# Patient Record
Sex: FEMALE | Race: WHITE | Employment: FULL TIME | URBAN - METROPOLITAN AREA
[De-identification: names, ages, dates, MRNs, and addresses within clinical notes are randomized per-mention and may not be internally consistent; named-entity substitution may affect disease eponyms.]

---

## 2020-01-09 RX ORDER — SODIUM CHLORIDE, SODIUM LACTATE, POTASSIUM CHLORIDE, CALCIUM CHLORIDE 600; 310; 30; 20 MG/100ML; MG/100ML; MG/100ML; MG/100ML
INJECTION, SOLUTION INTRAVENOUS CONTINUOUS
Status: CANCELLED | OUTPATIENT
Start: 2020-01-09 | End: 2020-01-10

## 2020-01-31 ENCOUNTER — HOSPITAL ENCOUNTER (OUTPATIENT)
Dept: PREADMISSION TESTING | Age: 35
Discharge: HOME | End: 2020-01-31
Payer: COMMERCIAL

## 2020-01-31 VITALS
RESPIRATION RATE: 20 BRPM | OXYGEN SATURATION: 97 % | BODY MASS INDEX: 27.08 KG/M2 | SYSTOLIC BLOOD PRESSURE: 157 MMHG | HEIGHT: 67 IN | DIASTOLIC BLOOD PRESSURE: 92 MMHG | TEMPERATURE: 98.8 F | HEART RATE: 98 BPM | WEIGHT: 172.56 LBS

## 2020-01-31 LAB
ABO + RH BLD: NORMAL
BLOOD GROUP ANTIBODIES SERPL: NORMAL
ERYTHROCYTE [DISTWIDTH] IN BLOOD BY AUTOMATED COUNT: 13.2 % (ref 11.5–14.5)
HCT VFR BLD AUTO: 45.3 % (ref 35–47)
HGB BLD-MCNC: 14.8 G/DL (ref 11.5–16)
MCH RBC QN AUTO: 29.5 PG (ref 26–34)
MCHC RBC AUTO-ENTMCNC: 32.7 G/DL (ref 30–36.5)
MCV RBC AUTO: 90.4 FL (ref 80–99)
NRBC # BLD: 0 K/UL (ref 0–0.01)
NRBC BLD-RTO: 0 PER 100 WBC
PLATELET # BLD AUTO: 389 K/UL (ref 150–400)
PMV BLD AUTO: 10.4 FL (ref 8.9–12.9)
RBC # BLD AUTO: 5.01 M/UL (ref 3.8–5.2)
SPECIMEN EXP DATE BLD: NORMAL
WBC # BLD AUTO: 8.5 K/UL (ref 3.6–11)

## 2020-01-31 PROCEDURE — 36415 COLL VENOUS BLD VENIPUNCTURE: CPT

## 2020-01-31 PROCEDURE — 85027 COMPLETE CBC AUTOMATED: CPT

## 2020-01-31 PROCEDURE — 86900 BLOOD TYPING SEROLOGIC ABO: CPT

## 2020-01-31 NOTE — PERIOP NOTES
[address] , [address] [facility]   MAIN OR                                  [phone]   MAIN PRE OP                          [phone]                                                                                AMBULATORY PRE OP          [phone]  PRE-ADMISSION TESTING    [phone]     Surgery Date: Wednesday, February 12, 2020      Is surgery arrival time given by surgeon? NO  If NO, [facility] staff will call you between 3 and 7pm the day before your surgery with your arrival time. (If your surgery is on a Monday, we will call you the Friday before.)    Call [phone] after 7pm Monday-Friday if you did not receive this call. INSTRUCTIONS BEFORE YOUR SURGERY   When You  Arrive Arrive at the [address] [facility] on the day of your surgery  Have your insurance card, photo ID, and any copayment (if needed)   Food   and   Drink NO food or drink after midnight the night before surgery    This means NO water, gum, mints, coffee, juice, etc.  No alcohol (beer, wine, liquor) 24 hours before and after surgery   Medications to   TAKE   Morning of Surgery MEDICATIONS TO TAKE THE MORNING OF SURGERY WITH A SIP OF WATER:    None   Medications  To  STOP      7 days before surgery  Non-Steroidal anti-inflammatory Drugs (NSAID's): for example, Ibuprofen (Advil, Motrin), Naproxen (Aleve)   Aspirin, if taking for pain    Herbal supplements, vitamins, and fish oil  (Pain medications not listed above, including Tylenol may be taken)   Blood  Thinners  Not applicable   Bathing Clothing  Jewelry  Valuables      If you shower the morning of surgery, please do not apply anything to your skin (lotions, powders, deodorant, or makeup, especially mascara)   Follow Chlorhexidine Care Fusion body wash instructions provided to you during PAT appointment. Begin 3 days prior to surgery.    Do not shave or trim anywhere 24 hours before surgery   Wear your hair loose or down; no pony-tails, buns, or metal hair clips   Wear loose, comfortable, clean clothes   Wear glasses instead of contacts   Leave money, valuables, and jewelry, including body piercings, at home   Going Home - or Spending the Night  SAME-DAY SURGERY: You must have a responsible adult drive you home and stay with you 24 hours after surgery   ADMITS: If your doctor is keeping you in the hospital after surgery, leave personal belongings/luggage in your car until you have a hospital room number. Hospital discharge time is 12 noon  Drivers must be here before 12 noon unless you are told differently   Special Instructions Free  Parking at main hospital entrance Monday-Friday 7a-5p. Follow all instructions so your surgery wont be cancelled. Please, be on time. If a situation occurs and you are delayed the day of surgery, call  [phone]. If your physical condition changes (like a fever, cold, flu, etc.) call your surgeon. Home medication(s) reviewed and verified verbally with patient during PAT appointment. The patient was contacted  in person. The patient verbalizes understanding of all instructions and does not  need reinforcement.

## 2020-02-11 ENCOUNTER — ANESTHESIA EVENT (OUTPATIENT)
Dept: SURGERY | Age: 35
DRG: 743 | End: 2020-02-11
Payer: COMMERCIAL

## 2020-02-12 ENCOUNTER — HOSPITAL ENCOUNTER (INPATIENT)
Age: 35
LOS: 2 days | Discharge: HOME | DRG: 743 | End: 2020-02-14
Attending: STUDENT IN AN ORGANIZED HEALTH CARE EDUCATION/TRAINING PROGRAM | Admitting: STUDENT IN AN ORGANIZED HEALTH CARE EDUCATION/TRAINING PROGRAM
Payer: COMMERCIAL

## 2020-02-12 ENCOUNTER — ANESTHESIA (OUTPATIENT)
Dept: SURGERY | Age: 35
DRG: 743 | End: 2020-02-12
Payer: COMMERCIAL

## 2020-02-12 DIAGNOSIS — K59.03 CONSTIPATION DUE TO OPIOID THERAPY: ICD-10-CM

## 2020-02-12 DIAGNOSIS — T40.2X5A CONSTIPATION DUE TO OPIOID THERAPY: ICD-10-CM

## 2020-02-12 DIAGNOSIS — G89.18 POSTOPERATIVE PAIN: Primary | ICD-10-CM

## 2020-02-12 PROBLEM — N93.9 ABNORMAL UTERINE BLEEDING: Status: ACTIVE | Noted: 2020-02-12

## 2020-02-12 PROBLEM — D25.9 UTERINE FIBROID: Status: ACTIVE | Noted: 2020-02-12

## 2020-02-12 LAB — HCG UR QL: NEGATIVE

## 2020-02-12 PROCEDURE — 77030002888 HC SUT CHRMC J&J -A: Performed by: STUDENT IN AN ORGANIZED HEALTH CARE EDUCATION/TRAINING PROGRAM

## 2020-02-12 PROCEDURE — 77030011278 HC ELECTRD LIG IMPT COVD -F: Performed by: STUDENT IN AN ORGANIZED HEALTH CARE EDUCATION/TRAINING PROGRAM

## 2020-02-12 PROCEDURE — 77030031139 HC SUT VCRL2 J&J -A: Performed by: STUDENT IN AN ORGANIZED HEALTH CARE EDUCATION/TRAINING PROGRAM

## 2020-02-12 PROCEDURE — 88307 TISSUE EXAM BY PATHOLOGIST: CPT

## 2020-02-12 PROCEDURE — 74011250636 HC RX REV CODE- 250/636: Performed by: NURSE ANESTHETIST, CERTIFIED REGISTERED

## 2020-02-12 PROCEDURE — 77030011640 HC PAD GRND REM COVD -A: Performed by: STUDENT IN AN ORGANIZED HEALTH CARE EDUCATION/TRAINING PROGRAM

## 2020-02-12 PROCEDURE — 77030036554: Performed by: STUDENT IN AN ORGANIZED HEALTH CARE EDUCATION/TRAINING PROGRAM

## 2020-02-12 PROCEDURE — 81025 URINE PREGNANCY TEST: CPT

## 2020-02-12 PROCEDURE — 77030007866 HC KT SPN ANES BBMI -B: Performed by: ANESTHESIOLOGY

## 2020-02-12 PROCEDURE — 77030011264 HC ELECTRD BLD EXT COVD -A: Performed by: STUDENT IN AN ORGANIZED HEALTH CARE EDUCATION/TRAINING PROGRAM

## 2020-02-12 PROCEDURE — 77030003029 HC SUT VCRL J&J -B: Performed by: STUDENT IN AN ORGANIZED HEALTH CARE EDUCATION/TRAINING PROGRAM

## 2020-02-12 PROCEDURE — 76060000065 HC AMB SURG ANES 2.5 TO 3 HR: Performed by: STUDENT IN AN ORGANIZED HEALTH CARE EDUCATION/TRAINING PROGRAM

## 2020-02-12 PROCEDURE — 77030019908 HC STETH ESOPH SIMS -A: Performed by: ANESTHESIOLOGY

## 2020-02-12 PROCEDURE — 88305 TISSUE EXAM BY PATHOLOGIST: CPT

## 2020-02-12 PROCEDURE — 77030040361 HC SLV COMPR DVT MDII -B

## 2020-02-12 PROCEDURE — 77030027138 HC INCENT SPIROMETER -A

## 2020-02-12 PROCEDURE — 74011250636 HC RX REV CODE- 250/636: Performed by: ANESTHESIOLOGY

## 2020-02-12 PROCEDURE — 77030018836 HC SOL IRR NACL ICUM -A: Performed by: STUDENT IN AN ORGANIZED HEALTH CARE EDUCATION/TRAINING PROGRAM

## 2020-02-12 PROCEDURE — 77030002933 HC SUT MCRYL J&J -A: Performed by: STUDENT IN AN ORGANIZED HEALTH CARE EDUCATION/TRAINING PROGRAM

## 2020-02-12 PROCEDURE — 77030005513 HC CATH URETH FOL11 MDII -B: Performed by: STUDENT IN AN ORGANIZED HEALTH CARE EDUCATION/TRAINING PROGRAM

## 2020-02-12 PROCEDURE — 74011250637 HC RX REV CODE- 250/637: Performed by: STUDENT IN AN ORGANIZED HEALTH CARE EDUCATION/TRAINING PROGRAM

## 2020-02-12 PROCEDURE — 0JB80ZZ EXCISION OF ABDOMEN SUBCUTANEOUS TISSUE AND FASCIA, OPEN APPROACH: ICD-10-PCS | Performed by: STUDENT IN AN ORGANIZED HEALTH CARE EDUCATION/TRAINING PROGRAM

## 2020-02-12 PROCEDURE — 74011000250 HC RX REV CODE- 250: Performed by: STUDENT IN AN ORGANIZED HEALTH CARE EDUCATION/TRAINING PROGRAM

## 2020-02-12 PROCEDURE — 65270000029 HC RM PRIVATE

## 2020-02-12 PROCEDURE — 77030008684 HC TU ET CUF COVD -B: Performed by: ANESTHESIOLOGY

## 2020-02-12 PROCEDURE — 76030000005 HC AMB SURG OR TIME 2.5 TO 3: Performed by: STUDENT IN AN ORGANIZED HEALTH CARE EDUCATION/TRAINING PROGRAM

## 2020-02-12 PROCEDURE — 0UT70ZZ RESECTION OF BILATERAL FALLOPIAN TUBES, OPEN APPROACH: ICD-10-PCS | Performed by: STUDENT IN AN ORGANIZED HEALTH CARE EDUCATION/TRAINING PROGRAM

## 2020-02-12 PROCEDURE — 77030008771 HC TU NG SALEM SUMP -A: Performed by: ANESTHESIOLOGY

## 2020-02-12 PROCEDURE — 74011000250 HC RX REV CODE- 250: Performed by: NURSE ANESTHETIST, CERTIFIED REGISTERED

## 2020-02-12 PROCEDURE — 77030002974 HC SUT PLN J&J -A: Performed by: STUDENT IN AN ORGANIZED HEALTH CARE EDUCATION/TRAINING PROGRAM

## 2020-02-12 PROCEDURE — 74011250636 HC RX REV CODE- 250/636: Performed by: STUDENT IN AN ORGANIZED HEALTH CARE EDUCATION/TRAINING PROGRAM

## 2020-02-12 PROCEDURE — 77030040922 HC BLNKT HYPOTHRM STRY -A

## 2020-02-12 PROCEDURE — 77030026438 HC STYL ET INTUB CARD -A: Performed by: ANESTHESIOLOGY

## 2020-02-12 PROCEDURE — 76210000036 HC AMBSU PH I REC 1.5 TO 2 HR: Performed by: STUDENT IN AN ORGANIZED HEALTH CARE EDUCATION/TRAINING PROGRAM

## 2020-02-12 PROCEDURE — 0UT90ZZ RESECTION OF UTERUS, OPEN APPROACH: ICD-10-PCS | Performed by: STUDENT IN AN ORGANIZED HEALTH CARE EDUCATION/TRAINING PROGRAM

## 2020-02-12 PROCEDURE — C1765 ADHESION BARRIER: HCPCS | Performed by: STUDENT IN AN ORGANIZED HEALTH CARE EDUCATION/TRAINING PROGRAM

## 2020-02-12 PROCEDURE — 77030013079 HC BLNKT BAIR HGGR 3M -A: Performed by: ANESTHESIOLOGY

## 2020-02-12 PROCEDURE — 65660000000 HC RM CCU STEPDOWN

## 2020-02-12 RX ORDER — BUPIVACAINE HYDROCHLORIDE AND EPINEPHRINE 5; 5 MG/ML; UG/ML
INJECTION, SOLUTION EPIDURAL; INTRACAUDAL; PERINEURAL AS NEEDED
Status: DISCONTINUED | OUTPATIENT
Start: 2020-02-12 | End: 2020-02-12 | Stop reason: HOSPADM

## 2020-02-12 RX ORDER — NEOSTIGMINE METHYLSULFATE 1 MG/ML
INJECTION INTRAVENOUS AS NEEDED
Status: DISCONTINUED | OUTPATIENT
Start: 2020-02-12 | End: 2020-02-12 | Stop reason: HOSPADM

## 2020-02-12 RX ORDER — CEFAZOLIN SODIUM 1 G/3ML
INJECTION, POWDER, FOR SOLUTION INTRAMUSCULAR; INTRAVENOUS AS NEEDED
Status: DISCONTINUED | OUTPATIENT
Start: 2020-02-12 | End: 2020-02-12 | Stop reason: HOSPADM

## 2020-02-12 RX ORDER — KETOROLAC TROMETHAMINE 30 MG/ML
INJECTION, SOLUTION INTRAMUSCULAR; INTRAVENOUS AS NEEDED
Status: DISCONTINUED | OUTPATIENT
Start: 2020-02-12 | End: 2020-02-12 | Stop reason: HOSPADM

## 2020-02-12 RX ORDER — SODIUM CHLORIDE 9 MG/ML
125 INJECTION, SOLUTION INTRAVENOUS CONTINUOUS
Status: DISCONTINUED | OUTPATIENT
Start: 2020-02-12 | End: 2020-02-13

## 2020-02-12 RX ORDER — LIDOCAINE HYDROCHLORIDE 10 MG/ML
0.1 INJECTION, SOLUTION EPIDURAL; INFILTRATION; INTRACAUDAL; PERINEURAL AS NEEDED
Status: DISCONTINUED | OUTPATIENT
Start: 2020-02-12 | End: 2020-02-12 | Stop reason: HOSPADM

## 2020-02-12 RX ORDER — DIPHENHYDRAMINE HYDROCHLORIDE 50 MG/ML
12.5 INJECTION, SOLUTION INTRAMUSCULAR; INTRAVENOUS
Status: DISCONTINUED | OUTPATIENT
Start: 2020-02-12 | End: 2020-02-14 | Stop reason: HOSPADM

## 2020-02-12 RX ORDER — MAGNESIUM SULFATE HEPTAHYDRATE 40 MG/ML
INJECTION, SOLUTION INTRAVENOUS AS NEEDED
Status: DISCONTINUED | OUTPATIENT
Start: 2020-02-12 | End: 2020-02-12 | Stop reason: HOSPADM

## 2020-02-12 RX ORDER — SODIUM CHLORIDE 0.9 % (FLUSH) 0.9 %
5-40 SYRINGE (ML) INJECTION EVERY 8 HOURS
Status: DISCONTINUED | OUTPATIENT
Start: 2020-02-12 | End: 2020-02-14 | Stop reason: HOSPADM

## 2020-02-12 RX ORDER — HYDROMORPHONE HYDROCHLORIDE 1 MG/ML
.25-1 INJECTION, SOLUTION INTRAMUSCULAR; INTRAVENOUS; SUBCUTANEOUS
Status: DISCONTINUED | OUTPATIENT
Start: 2020-02-12 | End: 2020-02-12 | Stop reason: HOSPADM

## 2020-02-12 RX ORDER — DIPHENHYDRAMINE HYDROCHLORIDE 50 MG/ML
12.5 INJECTION, SOLUTION INTRAMUSCULAR; INTRAVENOUS AS NEEDED
Status: DISCONTINUED | OUTPATIENT
Start: 2020-02-12 | End: 2020-02-12 | Stop reason: HOSPADM

## 2020-02-12 RX ORDER — MORPHINE SULFATE 0.5 MG/ML
INJECTION, SOLUTION EPIDURAL; INTRATHECAL; INTRAVENOUS AS NEEDED
Status: DISCONTINUED | OUTPATIENT
Start: 2020-02-12 | End: 2020-02-12 | Stop reason: HOSPADM

## 2020-02-12 RX ORDER — LIDOCAINE HYDROCHLORIDE 20 MG/ML
INJECTION, SOLUTION EPIDURAL; INFILTRATION; INTRACAUDAL; PERINEURAL AS NEEDED
Status: DISCONTINUED | OUTPATIENT
Start: 2020-02-12 | End: 2020-02-12 | Stop reason: HOSPADM

## 2020-02-12 RX ORDER — SODIUM CHLORIDE, SODIUM LACTATE, POTASSIUM CHLORIDE, CALCIUM CHLORIDE 600; 310; 30; 20 MG/100ML; MG/100ML; MG/100ML; MG/100ML
125 INJECTION, SOLUTION INTRAVENOUS CONTINUOUS
Status: DISCONTINUED | OUTPATIENT
Start: 2020-02-12 | End: 2020-02-12 | Stop reason: HOSPADM

## 2020-02-12 RX ORDER — MIDAZOLAM HYDROCHLORIDE 1 MG/ML
INJECTION, SOLUTION INTRAMUSCULAR; INTRAVENOUS AS NEEDED
Status: DISCONTINUED | OUTPATIENT
Start: 2020-02-12 | End: 2020-02-12 | Stop reason: HOSPADM

## 2020-02-12 RX ORDER — SODIUM CHLORIDE 0.9 % (FLUSH) 0.9 %
5-40 SYRINGE (ML) INJECTION AS NEEDED
Status: DISCONTINUED | OUTPATIENT
Start: 2020-02-12 | End: 2020-02-12 | Stop reason: HOSPADM

## 2020-02-12 RX ORDER — SODIUM CHLORIDE 0.9 % (FLUSH) 0.9 %
5-40 SYRINGE (ML) INJECTION AS NEEDED
Status: DISCONTINUED | OUTPATIENT
Start: 2020-02-12 | End: 2020-02-14 | Stop reason: HOSPADM

## 2020-02-12 RX ORDER — SUCCINYLCHOLINE CHLORIDE 20 MG/ML
INJECTION INTRAMUSCULAR; INTRAVENOUS AS NEEDED
Status: DISCONTINUED | OUTPATIENT
Start: 2020-02-12 | End: 2020-02-12 | Stop reason: HOSPADM

## 2020-02-12 RX ORDER — DEXAMETHASONE SODIUM PHOSPHATE 4 MG/ML
INJECTION, SOLUTION INTRA-ARTICULAR; INTRALESIONAL; INTRAMUSCULAR; INTRAVENOUS; SOFT TISSUE AS NEEDED
Status: DISCONTINUED | OUTPATIENT
Start: 2020-02-12 | End: 2020-02-12 | Stop reason: HOSPADM

## 2020-02-12 RX ORDER — NALOXONE HYDROCHLORIDE 0.4 MG/ML
0.2 INJECTION, SOLUTION INTRAMUSCULAR; INTRAVENOUS; SUBCUTANEOUS
Status: DISCONTINUED | OUTPATIENT
Start: 2020-02-12 | End: 2020-02-12 | Stop reason: HOSPADM

## 2020-02-12 RX ORDER — HYDROMORPHONE HCL/0.9% NACL/PF 0.5 MG/ML
PLASTIC BAG, INJECTION (ML) INTRAVENOUS CONTINUOUS
Status: DISCONTINUED | OUTPATIENT
Start: 2020-02-12 | End: 2020-02-13

## 2020-02-12 RX ORDER — SODIUM CHLORIDE, SODIUM LACTATE, POTASSIUM CHLORIDE, CALCIUM CHLORIDE 600; 310; 30; 20 MG/100ML; MG/100ML; MG/100ML; MG/100ML
INJECTION, SOLUTION INTRAVENOUS
Status: DISCONTINUED | OUTPATIENT
Start: 2020-02-12 | End: 2020-02-12 | Stop reason: HOSPADM

## 2020-02-12 RX ORDER — NALOXONE HYDROCHLORIDE 0.4 MG/ML
0.4 INJECTION, SOLUTION INTRAMUSCULAR; INTRAVENOUS; SUBCUTANEOUS AS NEEDED
Status: DISCONTINUED | OUTPATIENT
Start: 2020-02-12 | End: 2020-02-14 | Stop reason: HOSPADM

## 2020-02-12 RX ORDER — DOCUSATE SODIUM 100 MG/1
100 CAPSULE, LIQUID FILLED ORAL 2 TIMES DAILY
Status: DISCONTINUED | OUTPATIENT
Start: 2020-02-12 | End: 2020-02-14 | Stop reason: HOSPADM

## 2020-02-12 RX ORDER — FENTANYL CITRATE 50 UG/ML
INJECTION, SOLUTION INTRAMUSCULAR; INTRAVENOUS AS NEEDED
Status: DISCONTINUED | OUTPATIENT
Start: 2020-02-12 | End: 2020-02-12 | Stop reason: HOSPADM

## 2020-02-12 RX ORDER — ROCURONIUM BROMIDE 10 MG/ML
INJECTION, SOLUTION INTRAVENOUS AS NEEDED
Status: DISCONTINUED | OUTPATIENT
Start: 2020-02-12 | End: 2020-02-12 | Stop reason: HOSPADM

## 2020-02-12 RX ORDER — ONDANSETRON 2 MG/ML
4 INJECTION INTRAMUSCULAR; INTRAVENOUS
Status: DISCONTINUED | OUTPATIENT
Start: 2020-02-12 | End: 2020-02-14 | Stop reason: HOSPADM

## 2020-02-12 RX ORDER — KETAMINE HYDROCHLORIDE 10 MG/ML
INJECTION, SOLUTION INTRAMUSCULAR; INTRAVENOUS AS NEEDED
Status: DISCONTINUED | OUTPATIENT
Start: 2020-02-12 | End: 2020-02-12 | Stop reason: HOSPADM

## 2020-02-12 RX ORDER — KETOROLAC TROMETHAMINE 30 MG/ML
30 INJECTION, SOLUTION INTRAMUSCULAR; INTRAVENOUS EVERY 6 HOURS
Status: DISCONTINUED | OUTPATIENT
Start: 2020-02-12 | End: 2020-02-13

## 2020-02-12 RX ORDER — SODIUM CHLORIDE 0.9 % (FLUSH) 0.9 %
5-40 SYRINGE (ML) INJECTION EVERY 8 HOURS
Status: DISCONTINUED | OUTPATIENT
Start: 2020-02-12 | End: 2020-02-12 | Stop reason: HOSPADM

## 2020-02-12 RX ORDER — FLUMAZENIL 0.1 MG/ML
0.2 INJECTION INTRAVENOUS
Status: DISCONTINUED | OUTPATIENT
Start: 2020-02-12 | End: 2020-02-12 | Stop reason: HOSPADM

## 2020-02-12 RX ORDER — GLYCOPYRROLATE 0.2 MG/ML
INJECTION INTRAMUSCULAR; INTRAVENOUS AS NEEDED
Status: DISCONTINUED | OUTPATIENT
Start: 2020-02-12 | End: 2020-02-12 | Stop reason: HOSPADM

## 2020-02-12 RX ORDER — HYDROMORPHONE HYDROCHLORIDE 1 MG/ML
1 INJECTION, SOLUTION INTRAMUSCULAR; INTRAVENOUS; SUBCUTANEOUS
Status: DISCONTINUED | OUTPATIENT
Start: 2020-02-12 | End: 2020-02-13 | Stop reason: SDUPTHER

## 2020-02-12 RX ORDER — PROPOFOL 10 MG/ML
INJECTION, EMULSION INTRAVENOUS AS NEEDED
Status: DISCONTINUED | OUTPATIENT
Start: 2020-02-12 | End: 2020-02-12 | Stop reason: HOSPADM

## 2020-02-12 RX ORDER — ONDANSETRON 2 MG/ML
INJECTION INTRAMUSCULAR; INTRAVENOUS AS NEEDED
Status: DISCONTINUED | OUTPATIENT
Start: 2020-02-12 | End: 2020-02-12 | Stop reason: HOSPADM

## 2020-02-12 RX ADMIN — CEFAZOLIN 2 G: 1 INJECTION, POWDER, FOR SOLUTION INTRAMUSCULAR; INTRAVENOUS at 07:42

## 2020-02-12 RX ADMIN — DOCUSATE SODIUM 100 MG: 100 CAPSULE, LIQUID FILLED ORAL at 17:48

## 2020-02-12 RX ADMIN — ROCURONIUM BROMIDE 10 MG: 50 INJECTION, SOLUTION INTRAVENOUS at 08:43

## 2020-02-12 RX ADMIN — ONDANSETRON 4 MG: 2 INJECTION INTRAMUSCULAR; INTRAVENOUS at 09:50

## 2020-02-12 RX ADMIN — FENTANYL CITRATE 50 MCG: 50 INJECTION, SOLUTION INTRAMUSCULAR; INTRAVENOUS at 10:24

## 2020-02-12 RX ADMIN — ROCURONIUM BROMIDE 10 MG: 50 INJECTION, SOLUTION INTRAVENOUS at 09:19

## 2020-02-12 RX ADMIN — Medication: at 11:05

## 2020-02-12 RX ADMIN — MORPHINE SULFATE 0.1 MG: 0.5 INJECTION, SOLUTION EPIDURAL; INTRATHECAL; INTRAVENOUS at 07:28

## 2020-02-12 RX ADMIN — SODIUM CHLORIDE 125 ML/HR: 900 INJECTION, SOLUTION INTRAVENOUS at 11:00

## 2020-02-12 RX ADMIN — LIDOCAINE HYDROCHLORIDE 100 MG: 20 INJECTION, SOLUTION INTRAVENOUS at 07:37

## 2020-02-12 RX ADMIN — FENTANYL CITRATE 50 MCG: 50 INJECTION, SOLUTION INTRAMUSCULAR; INTRAVENOUS at 07:37

## 2020-02-12 RX ADMIN — SUCCINYLCHOLINE CHLORIDE 100 MG: 20 INJECTION, SOLUTION INTRAMUSCULAR; INTRAVENOUS; PARENTERAL at 07:38

## 2020-02-12 RX ADMIN — KETAMINE HYDROCHLORIDE 40 MG: 10 INJECTION INTRAMUSCULAR; INTRAVENOUS at 07:37

## 2020-02-12 RX ADMIN — GLYCOPYRROLATE 0.4 MG: 0.2 INJECTION, SOLUTION INTRAMUSCULAR; INTRAVENOUS at 09:50

## 2020-02-12 RX ADMIN — ROCURONIUM BROMIDE 20 MG: 50 INJECTION, SOLUTION INTRAVENOUS at 07:56

## 2020-02-12 RX ADMIN — PROPOFOL 50 MG: 10 INJECTION, EMULSION INTRAVENOUS at 07:38

## 2020-02-12 RX ADMIN — FENTANYL CITRATE 50 MCG: 50 INJECTION, SOLUTION INTRAMUSCULAR; INTRAVENOUS at 10:11

## 2020-02-12 RX ADMIN — KETOROLAC TROMETHAMINE 30 MG: 30 INJECTION, SOLUTION INTRAMUSCULAR at 15:50

## 2020-02-12 RX ADMIN — SODIUM CHLORIDE, POTASSIUM CHLORIDE, SODIUM LACTATE AND CALCIUM CHLORIDE: 600; 310; 30; 20 INJECTION, SOLUTION INTRAVENOUS at 07:31

## 2020-02-12 RX ADMIN — ROCURONIUM BROMIDE 25 MG: 50 INJECTION, SOLUTION INTRAVENOUS at 07:43

## 2020-02-12 RX ADMIN — PROPOFOL 150 MG: 10 INJECTION, EMULSION INTRAVENOUS at 07:37

## 2020-02-12 RX ADMIN — Medication 10 ML: at 21:27

## 2020-02-12 RX ADMIN — KETAMINE HYDROCHLORIDE 10 MG: 10 INJECTION INTRAMUSCULAR; INTRAVENOUS at 09:48

## 2020-02-12 RX ADMIN — KETOROLAC TROMETHAMINE 30 MG: 30 INJECTION INTRAMUSCULAR; INTRAVENOUS at 09:50

## 2020-02-12 RX ADMIN — SODIUM CHLORIDE 125 ML/HR: 900 INJECTION, SOLUTION INTRAVENOUS at 19:45

## 2020-02-12 RX ADMIN — ROCURONIUM BROMIDE 5 MG: 50 INJECTION, SOLUTION INTRAVENOUS at 07:37

## 2020-02-12 RX ADMIN — KETOROLAC TROMETHAMINE 30 MG: 30 INJECTION, SOLUTION INTRAMUSCULAR at 21:27

## 2020-02-12 RX ADMIN — MIDAZOLAM 3 MG: 1 INJECTION INTRAMUSCULAR; INTRAVENOUS at 07:25

## 2020-02-12 RX ADMIN — KETAMINE HYDROCHLORIDE 10 MG: 10 INJECTION INTRAMUSCULAR; INTRAVENOUS at 08:36

## 2020-02-12 RX ADMIN — SODIUM CHLORIDE, POTASSIUM CHLORIDE, SODIUM LACTATE AND CALCIUM CHLORIDE: 600; 310; 30; 20 INJECTION, SOLUTION INTRAVENOUS at 09:21

## 2020-02-12 RX ADMIN — NEOSTIGMINE METHYLSULFATE 3 MG: 1 INJECTION, SOLUTION INTRAVENOUS at 09:50

## 2020-02-12 RX ADMIN — DEXAMETHASONE SODIUM PHOSPHATE 8 MG: 4 INJECTION, SOLUTION INTRAMUSCULAR; INTRAVENOUS at 07:43

## 2020-02-12 RX ADMIN — HYDROMORPHONE HYDROCHLORIDE 0.5 MG: 1 INJECTION, SOLUTION INTRAMUSCULAR; INTRAVENOUS; SUBCUTANEOUS at 10:40

## 2020-02-12 RX ADMIN — MAGNESIUM SULFATE 2 G: 2 INJECTION INTRAVENOUS at 07:39

## 2020-02-12 RX ADMIN — SODIUM CHLORIDE, POTASSIUM CHLORIDE, SODIUM LACTATE AND CALCIUM CHLORIDE: 600; 310; 30; 20 INJECTION, SOLUTION INTRAVENOUS at 07:42

## 2020-02-12 RX ADMIN — Medication 5 ML: at 10:25

## 2020-02-12 NOTE — PERIOP NOTES
PACU IN REPORT FROM ANESTHESIA    Verbal report received from   Maria Del Carmen Cruz   [] MD Anesthesiologist    [x] CRNA   [x] with student    CHOICE ANESTHESIA:  [x] GENERAL  [] MAC  [] LOCAL  [] REGIONAL  [x] SPINAL/EPIDURAL (Single-Shot)    **Note the anesthesia record for medications given intraoperatively. **    SURGICAL PROCEDURE: Procedure(s) (LRB):  ABDOMINAL HYSTERECTOMY  (CIELO), BILATERAL SALPINGECTOMY, EXCISION OF PERINEAL SKIN LESION (N/A)     SURGEON: Win MAJANO DO.    Brief Initial Visual Assessment:    Patient Age: [] Infant(1-12mo)      []Pediatric(1-13yrs)    [] Adolescent(13-18yrs)    [x] Adult(18-65yrs)      []Geriatric Adult(>65yrs). Patient    [x] Alert           []Calm & Cooperative      [] Anxious  Appearance: [x] Drowsy      [] Sedated      [] Unresponsive     Oriented x  3              Airway:     [x] Patent                          [] Near-obstructed   [] Obstructed                        [] Airway improved with head/airway repositioning                       Airway Adjuncts Present: [] Oral Airway    [] Nasal Trumpet    [] ETT    [] LMA            Respiratory  [x] Even      [] Labored      [] Shallow  Pattern:    [x] Non-Labored  [] VENT and/or respiratory assistance     being provided. Skin:     [x] Pink [] Dusky    [] Pale        [x] Warm    [] Hot [] Cool        [] Cold   [x]Dry [] Moist [] Diaphoretic     Membranes:  [x] Pink [] Pale       [x] Moist [] Dry     [] Crusty     Pain:   [] No Acute Discomfort. 8  /10 Scale [x] Verbal Numeric   [] Moderate Discomfort.      [] V.A.S. [x] Acute Discomfort.                [] A.N.V.    [] Chronic-Issue Related Discomfort.   [] F.L.A.C.C. Note E-MAR for medications administered. []Faces, Petersen/Baker    Note assessments documented in flowsheets; any assessment variants to be found in comments or narrative perioperative nurse notes.        Post-anesthesia care now assumed by [facility] BSN, RN-BC

## 2020-02-12 NOTE — PROGRESS NOTES
POST ANESTHESIA CARE    DISCHARGE / TRANSFER NOTE  TRANSFER - OUT REPORT:    PHONE  report  WAS   given at 11:55 AM to   Cody Hinton RN  receiving   Damaris Oliver   and being transferred to   [facility]    for routine post - op  Following General for Procedure(s) (LRB):  ABDOMINAL HYSTERECTOMY  (CIELO), BILATERAL SALPINGECTOMY, EXCISION OF PERINEAL SKIN LESION (N/A) by  Dani Fleming DO. Patient Situation, Background, Assessment and Recommendations (SBAR) was provided and included information from the following SBAR report(s) (SBAR, OR Summary and MAR) which were reviewed with the receiving nurse. Lines:   Peripheral IV 02/12/20 Left Hand (Active)   Site Assessment Clean, dry, & intact 2/12/2020 10:20 AM   Phlebitis Assessment 0 2/12/2020 10:20 AM   Infiltration Assessment 0 2/12/2020 10:20 AM   Dressing Status Clean, dry, & intact 2/12/2020 10:20 AM   Dressing Type Transparent;Tape 2/12/2020 10:20 AM   Hub Color/Line Status Patent; Infusing 2/12/2020 10:20 AM   Alcohol Cap Used Yes 2/12/2020  6:23 AM       Peripheral IV 02/12/20 Right Hand (Active)   Site Assessment Clean, dry, & intact 2/12/2020 10:20 AM   Phlebitis Assessment 0 2/12/2020 10:20 AM   Infiltration Assessment 0 2/12/2020 10:20 AM   Dressing Status Clean, dry, & intact 2/12/2020 10:20 AM   Dressing Type Transparent;Tape 2/12/2020 10:20 AM   Hub Color/Line Status Patent; Flushed;Capped 2/12/2020 10:20 AM      Also Reviewed (checked if applicable):   [] NO ADDITIONAL REVIEWS  [x] PCA Drug and Settings (SEE NOTES concerning duramorph with PCA)     [] Cold Therapy with extra gels     [] On-Q @  ml/hr   [x] Drains x 1 (mast)      [] Significant Wound care/devices (e.g. Wound vac, etc.)     [] Holding pt in PACU awaiting bed availability - additional care provided and eMAR review  [] Vent Settings      [] Critical Care Drips  Contact Precautions:  There are currently no Active Isolations  Patient transported with:  O2 @ 1 liters  Registered Nurse      Raegan Castañeda Alex Galindo was   transfered   via   Bed    to  hospital room 428   . Patient was escorted by    nurse    . Patient verbalized   appreciation and was very pleased with care received   throughout their stay. Patient was discharged in     pleasant mood  . Pain at discharge/transfer was       3-4   /10. Discharge, medication and follow-up instructions were verbalized as understood prior to discharge  (if applicable for same-day procedures being discharged.)    All personal belongings have been returned to patient, and patient/family verbally confirm receiving belongings as all present. Additional Information: Interval SBAR report was completed at 12:30 PM and reviewed any changes to patient condition since last communication with receiving RN. 2RN skin check completed with Vanessa RN, and concerns for resp depression (as previously noted) discussed and emphasized. Pt resting NAD, VSS, O2 sat on RA spot check at 95%, awake, A&Ox3. After report, opportunity for questions and clarification was provided.     Signed: Manisha BUSTILLOS RN-BC

## 2020-02-12 NOTE — H&P
Day of surgery H&P Update     Pt seen and examined. No interval changes. Please see paper H&P from the office on 2/5/20. Diagnosis is Fibroid uterus, pelvic pain, abnormal uterine bleeding. Planned procedure is total abdominal hysterectomy and bilateral salpingectomy. Consents reviewed and signed and all questions answered. SCDs for DVT PPx. ABX PPx  Ancef 2gm IV. Proceed to OR.      [place] Close, DO

## 2020-02-12 NOTE — DISCHARGE INSTRUCTIONS
Abdominal Hysterectomy Discharge Instructions    Patient ID:  Radha Taveras  [id number]  36 y.o.  1985    Take Home Medications     What to do at Home    Recommended diet: AS TOLERATED                                    AVOID [address] PLENTY OF LIQUIDS    Recommended activity: GRADUALLY RESUME NORMAL ACTIVITIES OVER 4 WEEKS, LIMIT STAIRS AND LIFTING  NO DRIVING FOR 2 WEEKS  NOTHING IN VAGINA FOR 6 WEEKS      Call your doctor if you experience any of the following symptoms. FEVER OR CHILLS  HEAVY VAGINAL DRAINAGE OR SMELLY DISCHARGE  REDNESS, BLEEDING OR DISCHARGE AT INCISION SITE  PAIN OR SWELLING IN YOU LEGS    Follow-up with Dr. Nereyda Benavides  in 2 weeks. Abdominal Hysterectomy: What to Expect at [address] can expect to feel better and stronger each day, although you may need pain medicine for a week or two. You may get tired easily or have less energy than usual. This may last for several weeks after surgery. You will probably notice that your belly is swollen and puffy. This is common. The swelling will take several weeks to go down. You may take 6 to 8 weeks to fully recover. It is important to avoid lifting while you are recovering so that you can heal.    This care sheet gives you a general idea about how long it will take for you to recover. But each person recovers at a different pace. Follow the steps below to get better as quickly as possible. How can you care for yourself at home? Activity  Rest when you feel tired. Getting enough sleep will help you recover. Try to walk each day. Start by walking a little more than you did the day before. Bit by bit, increase the amount you walk. Walking boosts blood flow and helps prevent pneumonia and constipation. Avoid lifting anything that would make you strain.  This may include heavy grocery bags and milk containers, a heavy briefcase or backpack, cat litter or dog food bags, a child, or a vacuum . Avoid strenuous activities, such as biking, jogging, weight lifting, or aerobic exercise, until your doctor says it is okay. You may shower. Pat the cut (incision) dry. Do not take a bath for the first 2 weeks, or until your doctor tells you it is okay. You may drive when you are no longer taking prescription pain medicine and can quickly move your foot from the gas pedal to the brake. You must also be able to sit comfortably for a long period of time, even if you do not plan to go far. You might get caught in traffic. You will probably need to take 4-6 weeks off from work. It depends on the type of work you do and how you feel. Your doctor will tell you when you can have sex again. Diet  You can eat your normal diet. If your stomach is upset, try bland, low-fat foods like plain rice, broiled chicken, toast, and yogurt. Drink plenty of fluids (unless your doctor tells you not to). You may notice that your bowel movements are not regular right after your surgery. This is common. Try to avoid constipation and straining with bowel movements. You may want to take a fiber supplement every day. If you have not had a bowel movement after a couple of days, ask your doctor about taking a mild laxative. Medicines  Take pain medicines exactly as directed. If the doctor gave you a prescription medicine for pain, take it as prescribed. If you are not taking a prescription pain medicine, take an over-the-counter medicine such as acetaminophen (Tylenol), ibuprofen (Advil, Motrin), or naproxen (Aleve). Read and follow all instructions on the label. Do not take two or more pain medicines at the same time unless the doctor told you to. Many pain medicines have acetaminophen, which is Tylenol. Too much Tylenol can be harmful. If your doctor prescribed antibiotics, take them as directed. Do not stop taking them just because you feel better. You need to take the full course of antibiotics.   If you think your pain medicine is making you sick to your stomach: Take your medicine after meals (unless your doctor has told you not to). Ask your doctor for a different pain medicine. Incision care  If you have strips of tape on the cut (incision) the doctor made, leave the tape on for a week and then remove them. Wash the area daily with warm, soapy water, and pat it dry. Other cleaning products, such as hydrogen peroxide, can make the wound heal more slowly. You may cover the area with a gauze bandage if it weeps or rubs against clothing. Change the bandage every day. Keep the area clean and dry. If necessary, you may dry the incision with a hair dryer on a cool setting after showering. Other instructions  You may have some light vaginal bleeding. Wear sanitary pads if needed. Do not douche or use tampons. Follow-up care is a key part of your treatment and safety. Be sure to make and go to all appointments, and call your doctor if you are having problems. It's also a good idea to know your test results and keep a list of the medicines you take. When should you call for help? Call 911 anytime you think you may need emergency care. For example, call if:  You pass out (lose consciousness). You have sudden chest pain and shortness of breath, or you cough up blood. You have severe pain in your belly. Call your doctor now or seek immediate medical care if:  You have bright red vaginal bleeding that soaks one or more pads in an hour, or you have large clots. You have foul-smelling discharge from your vagina. You are sick to your stomach or cannot keep fluids down. You have signs of infection, such as: Increased pain, swelling, warmth, or redness. Red streaks leading from the incision. Pus draining from the incision. Swollen lymph nodes in your neck, armpits, or groin. A fever. You have pain that does not get better after you take pain medicine.   You have loose stitches, or your incision comes open.  You have signs of a blood clot, such as:  Pain in your calf, back of knee, thigh, or groin. Redness and swelling in your leg or groin. You have trouble passing urine or stool, especially if you have pain or swelling in your lower belly. You have hot flashes, sweating, flushing, or a fast or pounding heartbeat. Watch closely for changes in your health, and be sure to contact your doctor if:  You do not have a bowel movement after taking a laxative.

## 2020-02-12 NOTE — PROGRESS NOTES
Discussed with anesthesia continued pain despite spinal duramorph and need for PCA for pain control; risks and concern for resp. depression discussed. Will place order for continuous remote tele with continuous pulse ox for duration while on PCA. Will also inform receiving RN and floor charge to monitor closely.

## 2020-02-12 NOTE — ANESTHESIA PREPROCEDURE EVALUATION
Relevant Problems   No relevant active problems       Anesthetic History   No history of anesthetic complications            Review of Systems / Medical History  Patient summary reviewed and pertinent labs reviewed    Pulmonary          Smoker         Neuro/Psych   Within defined limits           Cardiovascular  Within defined limits                Exercise tolerance: >4 METS     GI/Hepatic/Renal  Within defined limits              Endo/Other  Within defined limits           Other Findings              Physical Exam    Airway  Mallampati: II  TM Distance: 4 - 6 cm  Neck ROM: normal range of motion   Mouth opening: Normal     Cardiovascular    Rhythm: regular  Rate: normal         Dental    Dentition: Upper dentition intact and Lower dentition intact     Pulmonary  Breath sounds clear to auscultation               Abdominal         Other Findings            Anesthetic Plan    ASA: 2  Anesthesia type: general          Induction: Intravenous  Anesthetic plan and risks discussed with: Patient      Duramorph spinal for post-op pain.

## 2020-02-12 NOTE — DISCHARGE SUMMARY
Gynecology Discharge Summary     Patient ID:  Arely Rivero  [id number]  68 y.o.  1985    Admit date: 2/12/2020     Discharge date and time: 2/14/2020    Admission Diagnoses:    Patient Active Problem List   Diagnosis Code    Uterine fibroid D25.9    Abnormal uterine bleeding N93.9       Discharge Diagnoses: No discharge information exists for this patient. Patient Active Problem List   Diagnosis Code    Uterine fibroid D25.9    Abnormal uterine bleeding N93.9       Procedures for this admission: Procedure(s):  ABDOMINAL HYSTERECTOMY  (CIELO), BILATERAL SALPINGECTOMY, EXCISION OF PERINEAL SKIN LESION    Hospital Course: Patient was admitted for surgery due to 22wk size enlarged fibroid uterus, pelvic pain and abnormal uterine bleeding. Underwent total abdominal hysterectomy and bilateral salingectomy. Her post-op course normal. She was ready for discharge home on POD 2. Disposition: home    Discharged Condition : stable    Instructions: Follow-up in office in 2 weeks.       Signed:  Sera Naidu DO  2/12/2020  10:31 AM

## 2020-02-12 NOTE — ANESTHESIA PROCEDURE NOTES
Spinal Block    Start time: 2/12/2020 7:25 AM  End time: 2/12/2020 7:28 AM  Performed by: Shaka Cross CRNA  Authorized by: Ramírez Lala MD     Pre-procedure:   Indications: at surgeon's request and primary anesthetic  Preanesthetic Checklist: patient identified, risks and benefits discussed, anesthesia consent and timeout performed    Timeout Time: 07:24          Spinal Block:   Patient Position:  Seated  Prep Region:  Lumbar  Prep: chlorhexidine      Location:  L3-4  Technique:  Single shot        Needle:   Needle Type:  Pencan  Needle Gauge:  25 G  Attempts:  1      Events: CSF confirmed, no blood with aspiration and no paresthesia        Assessment:  Insertion:  Uncomplicated  Patient tolerance:  Patient tolerated the procedure well with no immediate complications

## 2020-02-12 NOTE — BRIEF OP NOTE
BRIEF OPERATIVE NOTE    Date of Procedure: 2/12/2020   Preoperative Diagnosis: ENLARGED FIBRIOD UTERUS, ABNORNMAL UTERINE BLEEDING PELVIC PAIN  Postoperative Diagnosis: ABNORNMAL UTERINE BLEEDING PELVIC PAIN    Procedure(s):  ABDOMINAL HYSTERECTOMY  (CIELO), BILATERAL SALPINGECTOMY, EXCISION OF PERINEAL SKIN LESION  Surgeon(s) and Role:     * Alanna Vaughan DO - Primary     * Boby Winter DO - Assisting         Surgical Assistant: Yenifer Santos DO     Surgical Staff:  Circ-1: Jose Robledo RN  Circ-Relief: Alison Rm RN  Scrub RN-Relief: Wilfrido Saravia 197 Staff: Alison Rm RN  Event Time In Time Out   Incision Start 6636    Incision Close 1009      Anesthesia: General   Estimated Blood Loss: 700cc, UOP 380cc, IVF 2100cc LR   Specimens:   ID Type Source Tests Collected by Time Destination   1 : Perineal Lesion Preservative Skin Biopsy  Alanna Vaughan DO 2/12/2020 0811 Pathology   2 : Uterus, Cervix, Fibroids, Bilateral Tubes Preservative Uterus  Alanna Vaughan DO 2/12/2020 3601 Pathology      Findings: Large irregular shaped uterus, ~22wk size, multiple large fibroids ranging in size from 5-8cm, adhesions of thefallopian tube and ovary to fibroids on the right side.    Complications: None   Implants: * No implants in log *

## 2020-02-12 NOTE — ANESTHESIA POSTPROCEDURE EVALUATION
Procedure(s):  ABDOMINAL HYSTERECTOMY  (CIELO), BILATERAL SALPINGECTOMY, EXCISION OF PERINEAL SKIN LESION. general    Anesthesia Post Evaluation      Multimodal analgesia: multimodal analgesia used between 6 hours prior to anesthesia start to PACU discharge  Patient location during evaluation: bedside  Patient participation: complete - patient participated  Level of consciousness: awake  Pain management: adequate  Airway patency: patent  Anesthetic complications: no  Cardiovascular status: acceptable  Respiratory status: acceptable  Hydration status: acceptable        Vitals Value Taken Time   /81 2/12/2020 12:00 PM   Temp 37.1 °C (98.7 °F) 2/12/2020 10:23 AM   Pulse 106 2/12/2020 12:04 PM   Resp 20 2/12/2020 12:04 PM   SpO2 97 % 2/12/2020 12:04 PM   Vitals shown include unvalidated device data.

## 2020-02-13 LAB
ERYTHROCYTE [DISTWIDTH] IN BLOOD BY AUTOMATED COUNT: 13.4 % (ref 11.5–14.5)
HCT VFR BLD AUTO: 31.6 % (ref 35–47)
HGB BLD-MCNC: 10.7 G/DL (ref 11.5–16)
MCH RBC QN AUTO: 30.7 PG (ref 26–34)
MCHC RBC AUTO-ENTMCNC: 33.9 G/DL (ref 30–36.5)
MCV RBC AUTO: 90.5 FL (ref 80–99)
NRBC # BLD: 0 K/UL (ref 0–0.01)
NRBC BLD-RTO: 0 PER 100 WBC
PLATELET # BLD AUTO: 232 K/UL (ref 150–400)
PMV BLD AUTO: 10.3 FL (ref 8.9–12.9)
RBC # BLD AUTO: 3.49 M/UL (ref 3.8–5.2)
WBC # BLD AUTO: 12.2 K/UL (ref 3.6–11)

## 2020-02-13 PROCEDURE — 74011250637 HC RX REV CODE- 250/637: Performed by: STUDENT IN AN ORGANIZED HEALTH CARE EDUCATION/TRAINING PROGRAM

## 2020-02-13 PROCEDURE — 85027 COMPLETE CBC AUTOMATED: CPT

## 2020-02-13 PROCEDURE — 36415 COLL VENOUS BLD VENIPUNCTURE: CPT

## 2020-02-13 PROCEDURE — 74011250636 HC RX REV CODE- 250/636: Performed by: STUDENT IN AN ORGANIZED HEALTH CARE EDUCATION/TRAINING PROGRAM

## 2020-02-13 PROCEDURE — 65660000000 HC RM CCU STEPDOWN

## 2020-02-13 PROCEDURE — 94760 N-INVAS EAR/PLS OXIMETRY 1: CPT

## 2020-02-13 PROCEDURE — 65270000029 HC RM PRIVATE

## 2020-02-13 PROCEDURE — 77010033678 HC OXYGEN DAILY

## 2020-02-13 RX ORDER — IBUPROFEN 400 MG/1
800 TABLET ORAL
Status: DISCONTINUED | OUTPATIENT
Start: 2020-02-13 | End: 2020-02-14 | Stop reason: HOSPADM

## 2020-02-13 RX ORDER — HYDROMORPHONE HYDROCHLORIDE 1 MG/ML
1 INJECTION, SOLUTION INTRAMUSCULAR; INTRAVENOUS; SUBCUTANEOUS
Status: DISCONTINUED | OUTPATIENT
Start: 2020-02-13 | End: 2020-02-14 | Stop reason: HOSPADM

## 2020-02-13 RX ORDER — HYDROCODONE BITARTRATE AND ACETAMINOPHEN 7.5; 325 MG/1; MG/1
1 TABLET ORAL
Status: DISCONTINUED | OUTPATIENT
Start: 2020-02-13 | End: 2020-02-14 | Stop reason: HOSPADM

## 2020-02-13 RX ADMIN — DOCUSATE SODIUM 100 MG: 100 CAPSULE, LIQUID FILLED ORAL at 10:24

## 2020-02-13 RX ADMIN — HYDROCODONE BITARTRATE AND ACETAMINOPHEN 1 TABLET: 7.5; 325 TABLET ORAL at 16:52

## 2020-02-13 RX ADMIN — Medication 10 ML: at 22:00

## 2020-02-13 RX ADMIN — Medication: at 07:30

## 2020-02-13 RX ADMIN — HYDROCODONE BITARTRATE AND ACETAMINOPHEN 1 TABLET: 7.5; 325 TABLET ORAL at 21:16

## 2020-02-13 RX ADMIN — KETOROLAC TROMETHAMINE 30 MG: 30 INJECTION, SOLUTION INTRAMUSCULAR at 04:00

## 2020-02-13 RX ADMIN — Medication 5 ML: at 10:30

## 2020-02-13 RX ADMIN — HYDROCODONE BITARTRATE AND ACETAMINOPHEN 1 TABLET: 7.5; 325 TABLET ORAL at 10:24

## 2020-02-13 RX ADMIN — Medication 10 ML: at 04:00

## 2020-02-13 RX ADMIN — SODIUM CHLORIDE 125 ML/HR: 900 INJECTION, SOLUTION INTRAVENOUS at 04:00

## 2020-02-13 RX ADMIN — DOCUSATE SODIUM 100 MG: 100 CAPSULE, LIQUID FILLED ORAL at 18:05

## 2020-02-13 NOTE — PROGRESS NOTES
2/13/2020 3:00 PM Met with pt. Charted address and phone number confirmed. Reason for Admission: Elective admit under Dr. Baron Hernadez   Total abdominal hysterectomy, bilateral salpingectomy, and excision of perineal skin lesion. RUR: 4%    Advance Directive:  Name Relation Bacilio Sharpe [phone]     Assessment:    Age: 36    Sex: [] Male [x]Female     Residency: [x]Private residence []Apartment []Assisted Living []LTC []Other:   Exterior Steps:  Interior Steps:    Lives With: []With spouse []Other family members []Underage children []Alone []Care provider [x]Other:with boyfriend, Segundo Brenner    Prior functioning:  [x]Independent and working full time     Prior DME required:  [x]None []RW [employer] []Crutches []Bedside commode   []Shower Chair []Wheelchair []Hospital Bed [][place] []Stair lift []Rollator []Other:    DME available: [x]None []RW [employer] []Crutches []Bedside commode   []Shower Chair []Wheelchair []Hospital Bed [][place] []Stair lift []Rollator []Other:    Rehab history: [x]None []Outpatient PT []Home Health (Provider/Date: ) []SNF (Provider/Date: ) []IPR (Provider/Date: ) []LTC (Provider/Date: )    Discharge Concerns: []Yes [x]No []Unknown   Describe: Insurer: [address]: [facility] [employer] at [address]: [employer]        Transition of care plan:    []Unable to determine at this time. Awaiting clinical progress, and disposition recommendations. [x] Home with outpatient follow-up    Pt does not have a PCP, list of [employer] PCPs provided to pt. CM will follow. WALT Wright  Care Management Interventions  MyChart Signup: No  Discharge Durable Medical Equipment: No  Physical Therapy Consult: No  Occupational Therapy Consult: No  Speech Therapy Consult: No  Current Support Network:  Other  Confirm Follow Up Transport: Self

## 2020-02-13 NOTE — OP NOTES
Pawel Magaña [facility] 80  OPERATIVE REPORT    Name:  Jesus Chin  MR#:  [id number]  :  1985  ACCOUNT #:  [de-identified]  DATE OF SERVICE:  2020    PREOPERATIVE DIAGNOSES:  A 38year-old  0 with enlarged fibroid uterus, abnormal uterine bleeding, and pelvic pain. POSTOPERATIVE DIAGNOSES:  A 38year-old  0 with enlarged fibroid uterus, abnormal uterine bleeding, and pelvic pain. PROCEDURE PERFORMED:  Total abdominal hysterectomy, bilateral salpingectomy, and excision of perineal skin lesion. SURGEON:  Iván Liriano DO    ASSISTANT:  Arnulfo Dickson DO    ANESTHESIA:  General.    COMPLICATIONS:  None. SPECIMENS REMOVED:  Perineal lesion, uterus, cervix, fibroids, bilateral fallopian tubes. IMPLANTS:  None. ESTIMATED BLOOD LOSS:  700 mL. URINE OUTPUT:  180 mL. IV FLUIDS:  2100 mL of crystalloids. FINDINGS:  Large irregular shaped uterus, approximately 22-week size with multiple fibroids ranging in size from 5-8 cm, adhesions of the right fallopian tube and ovary, two fibroids on the right side. INDICATIONS:  The patient has been followed in the office for abnormal bleeding and pelvic pain over the last year. She was found to have a markedly enlarged fibroid uterus that has been converging and rapidly growing in size over the last six months. Her bleeding has been able to be controlled with oral Provera, but she continues to have pelvic pain daily from the large irregular fibroids. Many discussions were held regarding options for management of the fibroids and future fertility. She does not desire future fertility, is ready to proceed with definitive surgical management with hysterectomy and understands risks, benefits, and alternatives. All questions were answered prior to going to the operating room. DESCRIPTION OF THE PROCEDURE IN DETAIL:  The patient was taken to the operating room and positioned on the operating room table supine. Her vagina, perineum, and abdomen were prepped, and she was draped in sterile fashion after a timeout was performed. A 1cm  irregular mole on her mons was injected with 0.25% Marcaine with epinephrine and then, it was excised using a knife, the incision was closed using 4-0 Monocryl suture. Next, the hysterectomy was begun by making a vertical midline incision with a knife. This incision was carried down to the level of the fascia using a Bovie. Hemostasis was achieved using Bovie along the way. The fascia was incised and then the fascial opening extended using the Bovie. The rectus fascia was grasped on the right border with Kocher clamps and the rectus muscle belly dissected off. The peritoneum was identified and entered sharply. The peritoneal opening was extended using the Bovie. The 's hand was inserted into the abdomen to palpate a large irregular-shaped uterus with multiple large fibroids. The uterus was able to be delivered through the incision. The hysterectomy was begun on the patient's left side. The round ligament was identified, suture ligated, and then transected using the Bovie. The anterior leaf of the broad ligament was opened up to start developing a bladder flap. The fallopian tube was dissected off the left ovary using cautery. Then, a window was made in the peritoneum and the utero-ovarian ligament was clamped, cut, and suture ligated. Continued dissection was done on the left side to skeletonize the uterine arteries and take down the bladder flap. The uterine artery pedicle on the left was clamped, cut, and suture ligated. At this point, the rest of the uterus was inspected. There were some adhesions on the right side of the fallopian tube and ovary to fibroids with increased vascularity, and these were taken down using the LigaSure Impact. The round ligament was then identified, suture ligated, and transected using the Bovie in the peritoneum.   The broad ligament opened up and the bladder flap taken down further down off the cervix and vagina. The adhesions of the ovary to the uterus and fibroids were taken down using LigaSure device freeing the ovary. The utero-ovarian ligament was clamped, cut, and suture ligated followed by 0 Vicryl along the uterine body down the level of the uterine arteries, which were clamped, cut, and suture ligated. Three clamps were used along the cervix serially down to the level of the external os. Once both the uterine arteries were taken, the bulk of the uterus and fibroids was transected from the specimen just to create better visualization. The cervical stump was grasped with Kochers and elevated. Once the base of the cervix could be felt, the maximally curved Girish clamps were placed at the base of the cervix and the specimen was excised. The vaginal cuff was closed using interrupted sutures of 0 Vicryl with good hemostasis. The pelvis was irrigated. Hemostasis was noted to be excellent. Cefazolin was placed. The peritoneum was identified and closed using a 2-0 Vicryl suture. The rectus fascia was then closed with #1 Vicryl suture in a running fashion. The subcutaneous tissues were irrigated. Hemostasis was achieved using the Bovie and then, the subcutaneous tissue was closed using 2-0 plain gut suture in a running fashion followed by closure of the skin with 4-0 Monocryl in a subcuticular fashion. The skin was then bandaged with Mastisol, Steri-Strips, and then dressing applied along with an abdominal binder. A total of 20 mL of 0.25% Marcaine was injected into the incision to try to aid in postop pain relief. All counts were correct at the end of the case. There were no complications.         Rogelio Thorne DO      [id number]  D:  02/12/2020 10:56  T:  02/12/2020 19:25  JOB #:  [id number]  CC:

## 2020-02-13 NOTE — PROGRESS NOTES
GYN POD 1    Lanette Paulson      Doing well, Pain well controlled, minimal use of PCA last night (~1mg Dilaudid). Tolerating clear liquids, no N&V. UOP excellent. No flatus    Vitals:  Visit Vitals  /74 (BP 1 Location: Right arm, BP Patient Position: Supine)   Pulse 77   Temp 98.1 °F (36.7 °C)   Resp 18   Ht 5' 7\" (1.702 m)   Wt 79.1 kg (174 lb 6.1 oz)   LMP 2020 (Exact Date)   SpO2 98%   BMI 27.31 kg/m²     Temp (24hrs), Av °F (36.7 °C), Min:97.5 °F (36.4 °C), Max:98.7 °F (37.1 °C)      Last 24hr Input/Output:    Intake/Output Summary (Last 24 hours) at 2020 0759  Last data filed at 2020 0658  Gross per 24 hour   Intake 4645.84 ml   Output 4120 ml   Net 525.84 ml          Exam:  Patient without distress. Abdomen soft, bowel sounds present, expected tenderness. Lower extremities are negative for swelling, cords, or tenderness.     Labs:   Lab Results   Component Value Date/Time    WBC 12.2 (H) 2020 04:03 AM    WBC 8.5 2020 02:27 PM    HGB 10.7 (L) 2020 04:03 AM    HGB 14.8 2020 02:27 PM    HCT 31.6 (L) 2020 04:03 AM    HCT 45.3 2020 02:27 PM    PLATELET 939  04:03 AM    PLATELET 494  02:27 PM       Recent Results (from the past 24 hour(s))   CBC W/O DIFF    Collection Time: 20  4:03 AM   Result Value Ref Range    WBC 12.2 (H) 3.6 - 11.0 K/uL    RBC 3.49 (L) 3.80 - 5.20 M/uL    HGB 10.7 (L) 11.5 - 16.0 g/dL    HCT 31.6 (L) 35.0 - 47.0 %    MCV 90.5 80.0 - 99.0 FL    MCH 30.7 26.0 - 34.0 PG    MCHC 33.9 30.0 - 36.5 g/dL    RDW 13.4 11.5 - 14.5 %    PLATELET 667 332 - 940 K/uL    MPV 10.3 8.9 - 12.9 FL    NRBC 0.0 0  WBC    ABSOLUTE NRBC 0.00 0.00 - 0.01 K/uL     Assessment: POD 1 s/p CIELO, doing well     Plan: Continue routine post-op care  Advance diet  Oral pain medications  Ambulate    Sherice Lights, DO

## 2020-02-14 VITALS
HEART RATE: 79 BPM | TEMPERATURE: 98.1 F | SYSTOLIC BLOOD PRESSURE: 149 MMHG | RESPIRATION RATE: 18 BRPM | BODY MASS INDEX: 27.37 KG/M2 | WEIGHT: 174.38 LBS | DIASTOLIC BLOOD PRESSURE: 93 MMHG | OXYGEN SATURATION: 98 % | HEIGHT: 67 IN

## 2020-02-14 PROCEDURE — 74011250637 HC RX REV CODE- 250/637: Performed by: STUDENT IN AN ORGANIZED HEALTH CARE EDUCATION/TRAINING PROGRAM

## 2020-02-14 RX ORDER — HYDROCODONE BITARTRATE AND ACETAMINOPHEN 7.5; 325 MG/1; MG/1
1 TABLET ORAL
Qty: 28 TAB | Refills: 0 | Status: SHIPPED | OUTPATIENT
Start: 2020-02-14 | End: 2020-02-21

## 2020-02-14 RX ORDER — IBUPROFEN 800 MG/1
800 TABLET ORAL
Qty: 40 TAB | Refills: 1 | Status: SHIPPED | OUTPATIENT
Start: 2020-02-14

## 2020-02-14 RX ORDER — DOCUSATE SODIUM 100 MG/1
100 CAPSULE, LIQUID FILLED ORAL 2 TIMES DAILY
Qty: 60 CAP | Refills: 2 | Status: SHIPPED | OUTPATIENT
Start: 2020-02-14 | End: 2020-05-14

## 2020-02-14 RX ADMIN — DOCUSATE SODIUM 100 MG: 100 CAPSULE, LIQUID FILLED ORAL at 08:38

## 2020-02-14 RX ADMIN — IBUPROFEN 800 MG: 400 TABLET, FILM COATED ORAL at 08:38

## 2020-02-14 RX ADMIN — Medication 10 ML: at 06:00

## 2020-02-14 NOTE — PROGRESS NOTES
GYN POD 2    Lanette Paulson      +OOB, pain well controlled, tolerating reg diet, +void    Vitals:  Visit Vitals  /90 (BP 1 Location: Right arm, BP Patient Position: At rest)   Pulse 84   Temp 98.2 °F (36.8 °C)   Resp 18   Ht 5' 7\" (1.702 m)   Wt 79.1 kg (174 lb 6.1 oz)   LMP 2020 (Exact Date)   SpO2 97%   BMI 27.31 kg/m²     Temp (24hrs), Av °F (36.7 °C), Min:97.7 °F (36.5 °C), Max:98.2 °F (36.8 °C)      Last 24hr Input/Output:    Intake/Output Summary (Last 24 hours) at 2020 0750  Last data filed at 2020 1652  Gross per 24 hour   Intake    Output 1550 ml   Net -1550 ml          Exam:  Patient without distress. Lungs clear              Abdomen soft, bowel sounds present, expected tenderness. Incision dry and clean without erythema. Lower extremities are negative for swelling, cords, or tenderness.     Labs:   Lab Results   Component Value Date/Time    WBC 12.2 (H) 2020 04:03 AM    WBC 8.5 2020 02:27 PM    HGB 10.7 (L) 2020 04:03 AM    HGB 14.8 2020 02:27 PM    HCT 31.6 (L) 2020 04:03 AM    HCT 45.3 2020 02:27 PM    PLATELET 503  04:03 AM    PLATELET 389  02:27 PM       Assessment: POD 2 s/p CIELO, stable    Plan:   Doing well, routine care  Discharge home today, f/u in 2wks  Post-op instructions reviewed     Manisha Vick DO

## 2020-02-26 ENCOUNTER — HOSPITAL ENCOUNTER (INPATIENT)
Age: 35
LOS: 3 days | Discharge: HOME | DRG: 908 | End: 2020-03-02
Attending: STUDENT IN AN ORGANIZED HEALTH CARE EDUCATION/TRAINING PROGRAM | Admitting: STUDENT IN AN ORGANIZED HEALTH CARE EDUCATION/TRAINING PROGRAM
Payer: COMMERCIAL

## 2020-02-26 PROBLEM — T81.31XA VAGINAL CUFF DEHISCENCE: Status: ACTIVE | Noted: 2020-02-26

## 2020-02-26 LAB
ANION GAP SERPL CALC-SCNC: 5 MMOL/L (ref 5–15)
BASOPHILS # BLD: 0.1 K/UL (ref 0–0.1)
BASOPHILS NFR BLD: 1 % (ref 0–1)
BUN SERPL-MCNC: 8 MG/DL (ref 6–20)
BUN/CREAT SERPL: 10 (ref 12–20)
CALCIUM SERPL-MCNC: 9 MG/DL (ref 8.5–10.1)
CHLORIDE SERPL-SCNC: 106 MMOL/L (ref 97–108)
CO2 SERPL-SCNC: 29 MMOL/L (ref 21–32)
CREAT SERPL-MCNC: 0.82 MG/DL (ref 0.55–1.02)
DIFFERENTIAL METHOD BLD: ABNORMAL
EOSINOPHIL # BLD: 0.2 K/UL (ref 0–0.4)
EOSINOPHIL NFR BLD: 1 % (ref 0–7)
ERYTHROCYTE [DISTWIDTH] IN BLOOD BY AUTOMATED COUNT: 13.4 % (ref 11.5–14.5)
GLUCOSE SERPL-MCNC: 92 MG/DL (ref 65–100)
HCT VFR BLD AUTO: 40.1 % (ref 35–47)
HGB BLD-MCNC: 13.4 G/DL (ref 11.5–16)
IMM GRANULOCYTES # BLD AUTO: 0.2 K/UL (ref 0–0.04)
IMM GRANULOCYTES NFR BLD AUTO: 1 % (ref 0–0.5)
LYMPHOCYTES # BLD: 3.5 K/UL (ref 0.8–3.5)
LYMPHOCYTES NFR BLD: 23 % (ref 12–49)
MCH RBC QN AUTO: 29.4 PG (ref 26–34)
MCHC RBC AUTO-ENTMCNC: 33.4 G/DL (ref 30–36.5)
MCV RBC AUTO: 87.9 FL (ref 80–99)
MONOCYTES # BLD: 0.8 K/UL (ref 0–1)
MONOCYTES NFR BLD: 5 % (ref 5–13)
NEUTS SEG # BLD: 10.2 K/UL (ref 1.8–8)
NEUTS SEG NFR BLD: 68 % (ref 32–75)
NRBC # BLD: 0 K/UL (ref 0–0.01)
NRBC BLD-RTO: 0 PER 100 WBC
PLATELET # BLD AUTO: 581 K/UL (ref 150–400)
PMV BLD AUTO: 10.7 FL (ref 8.9–12.9)
POTASSIUM SERPL-SCNC: 3.5 MMOL/L (ref 3.5–5.1)
RBC # BLD AUTO: 4.56 M/UL (ref 3.8–5.2)
SODIUM SERPL-SCNC: 140 MMOL/L (ref 136–145)
WBC # BLD AUTO: 14.9 K/UL (ref 3.6–11)

## 2020-02-26 PROCEDURE — 74011000258 HC RX REV CODE- 258: Performed by: STUDENT IN AN ORGANIZED HEALTH CARE EDUCATION/TRAINING PROGRAM

## 2020-02-26 PROCEDURE — 36415 COLL VENOUS BLD VENIPUNCTURE: CPT

## 2020-02-26 PROCEDURE — 74011250636 HC RX REV CODE- 250/636: Performed by: STUDENT IN AN ORGANIZED HEALTH CARE EDUCATION/TRAINING PROGRAM

## 2020-02-26 PROCEDURE — 99218 HC RM OBSERVATION: CPT

## 2020-02-26 PROCEDURE — 85025 COMPLETE CBC W/AUTO DIFF WBC: CPT

## 2020-02-26 PROCEDURE — 80048 BASIC METABOLIC PNL TOTAL CA: CPT

## 2020-02-26 RX ORDER — SODIUM CHLORIDE 0.9 % (FLUSH) 0.9 %
5-40 SYRINGE (ML) INJECTION AS NEEDED
Status: DISCONTINUED | OUTPATIENT
Start: 2020-02-26 | End: 2020-03-02 | Stop reason: HOSPADM

## 2020-02-26 RX ORDER — IBUPROFEN 800 MG/1
800 TABLET ORAL
Status: DISCONTINUED | OUTPATIENT
Start: 2020-02-26 | End: 2020-02-27

## 2020-02-26 RX ORDER — SODIUM CHLORIDE 9 MG/ML
125 INJECTION, SOLUTION INTRAVENOUS CONTINUOUS
Status: DISCONTINUED | OUTPATIENT
Start: 2020-02-27 | End: 2020-02-29

## 2020-02-26 RX ORDER — SODIUM CHLORIDE 0.9 % (FLUSH) 0.9 %
5-40 SYRINGE (ML) INJECTION EVERY 8 HOURS
Status: DISCONTINUED | OUTPATIENT
Start: 2020-02-26 | End: 2020-03-02 | Stop reason: HOSPADM

## 2020-02-26 RX ORDER — SODIUM CHLORIDE 9 MG/ML
125 INJECTION, SOLUTION INTRAVENOUS AS NEEDED
Status: DISCONTINUED | OUTPATIENT
Start: 2020-02-27 | End: 2020-02-26

## 2020-02-26 RX ORDER — ONDANSETRON 2 MG/ML
4 INJECTION INTRAMUSCULAR; INTRAVENOUS
Status: DISCONTINUED | OUTPATIENT
Start: 2020-02-26 | End: 2020-03-02 | Stop reason: HOSPADM

## 2020-02-26 RX ORDER — ACETAMINOPHEN 325 MG/1
650 TABLET ORAL
Status: DISCONTINUED | OUTPATIENT
Start: 2020-02-26 | End: 2020-02-29

## 2020-02-26 RX ORDER — DIPHENHYDRAMINE HCL 25 MG
25 CAPSULE ORAL
Status: DISCONTINUED | OUTPATIENT
Start: 2020-02-26 | End: 2020-03-02 | Stop reason: HOSPADM

## 2020-02-26 RX ADMIN — Medication 10 ML: at 23:04

## 2020-02-26 RX ADMIN — PIPERACILLIN AND TAZOBACTAM 3.38 G: 3; .375 INJECTION, POWDER, LYOPHILIZED, FOR SOLUTION INTRAVENOUS at 16:48

## 2020-02-26 RX ADMIN — PIPERACILLIN AND TAZOBACTAM 3.38 G: 3; .375 INJECTION, POWDER, LYOPHILIZED, FOR SOLUTION INTRAVENOUS at 23:04

## 2020-02-26 RX ADMIN — Medication 10 ML: at 16:49

## 2020-02-26 NOTE — PROGRESS NOTES
Zosyn 3.375 gm IV Q6H adjusted to 3.375 gm IV Q8H extended-infusion over 240 minutes (first dose to be given over 30 minutes followed by second dose 6 hours after) per protocol    Thank you,  Brad L. Sharrell Lombard D, [place]

## 2020-02-26 NOTE — H&P
Gynecology History and Physical    Name: Isaak Bailey MRN: [id number] SSN: [id number]    YOB: 1985  Age: 36 y.o. Sex: female       Subjective:      Chief complaint:  Vaginal discharge     Monica mcdaniels is a 36 y.o.  female with a history of abdominal hysterectomy and bilateral salpingectomy for an enlarged ~24wk size fibroid uterus done on 2/12/20. She returned to the office today for 2wk follow up and reported purulent vaginal discharge with blood tinge. She denies fever, chills, constipation, abdominal pain. Her incision is healing well. She had an episode of diarrhea on 2/20/20 and after that was when she first noticed purulent vaginal discharge. She has not put anything in her vagina, denies sexual intercourse. On exam in the office she was noted to have an ~2cm vaginal cuff dehiscence, proximal to the opening ?bowel vs peritoneum was noted. Nothing was protruding through the opening. There was moderate amount of purulent, blood tinged drainage noted. Scant bleeding at the edge of the vaginal cuff. OB History    No obstetric history on file. No relevant past medical history   Past Surgical History   CIELO and bilateral salpingectomy       Social History     Occupational History    Not on file   Tobacco Use    Smoking status: Current Every Day Smoker     Packs/day: 0.25     Years: 10.00     Pack years: 2.50    Smokeless tobacco: Never Used   Substance and Sexual Activity    Alcohol use: Not Currently     Alcohol/week: 2.0 - 3.0 standard drinks     Types: 2 - 3 Glasses of wine per week     Comment: stopped about 4 weeks ago    Drug use: Never    Sexual activity: Not on file     No family history on file. No Known Allergies  Prior to Admission medications    Medication Sig Start Date End Date Taking? Authorizing Provider   docusate sodium (COLACE) 100 mg capsule Take 1 Cap by mouth two (2) times a day for 90 days.  2/14/20 5/14/20  Bernice Yun DO   ibuprofen (MOTRIN) 800 mg tablet Take 1 Tab by mouth every eight (8) hours as needed for Pain. 2/14/20   Luz Marina Yun,    aspirin-acetaminophen-caffeine (EXCEDRIN ES) 883-208-87 mg per tablet Take 3 Tabs by mouth as needed for Pain or Headache. Provider, Historical        Review of Systems  A comprehensive review of systems was negative except for that written in the History of Present Illness. Objective:     Vitals:    02/26/20 1504   BP: 121/84   Pulse: 100   Resp: 16   Temp: 97.7 °F (36.5 °C)   SpO2: 97%       Physical Exam:  Patient without distress. Heart: Regular rate and rhythm or without murmur or extra heart sounds  Lung: clear to auscultation throughout lung fields, no wheezes, no rales, no rhonchi and normal respiratory effort  Abdomen: soft, nontender, vertical skin incision well approximated, no erythema or exudate. External Genitalia: normal general appearance  Vagina: normal mucosa without prolapse or lesions and vaginal cuff with ~2cm opening at left aspect.      Assessment/Plan:   33yo G0 POD 14 after CIELO and bilateral salpingectomy, now with vaginal cuff dehiscence     Admit for IV ABX (Zosyn)  NPO at midnight for surgery tomorrow, scheduled Main OR at 12:30  Planned procedure is diagnostic laparoscopy and closure of vaginal cuff      Signed By:  Eliza Beckford DO     February 26, 2020

## 2020-02-26 NOTE — PROGRESS NOTES
1458: Patient has arrived at hospital. Called dr guerra's office and left a message with call center informing them that patient has arrived to unit    Spoke to Yajaira Gonzalez, Dr Guerra's nurse, informing her that patient has arrived to unit

## 2020-02-26 NOTE — PROGRESS NOTES
Problem: Falls - Risk of  Goal: *Absence of Falls  Description  Document Kishore Paulino Fall Risk and appropriate interventions in the flowsheet.   Outcome: Progressing Towards Goal  Note: Fall Risk Interventions:                                Problem: Patient Education: Go to Patient Education Activity  Goal: Patient/Family Education  Outcome: Progressing Towards Goal

## 2020-02-27 ENCOUNTER — ANESTHESIA EVENT (OUTPATIENT)
Dept: SURGERY | Age: 35
DRG: 908 | End: 2020-02-27
Payer: COMMERCIAL

## 2020-02-27 ENCOUNTER — ANESTHESIA (OUTPATIENT)
Dept: SURGERY | Age: 35
DRG: 908 | End: 2020-02-27
Payer: COMMERCIAL

## 2020-02-27 PROCEDURE — 77030040506 HC DRN WND MDII -A: Performed by: STUDENT IN AN ORGANIZED HEALTH CARE EDUCATION/TRAINING PROGRAM

## 2020-02-27 PROCEDURE — 99218 HC RM OBSERVATION: CPT

## 2020-02-27 PROCEDURE — 74011000250 HC RX REV CODE- 250: Performed by: STUDENT IN AN ORGANIZED HEALTH CARE EDUCATION/TRAINING PROGRAM

## 2020-02-27 PROCEDURE — 74011000272 HC RX REV CODE- 272: Performed by: STUDENT IN AN ORGANIZED HEALTH CARE EDUCATION/TRAINING PROGRAM

## 2020-02-27 PROCEDURE — C1765 ADHESION BARRIER: HCPCS | Performed by: STUDENT IN AN ORGANIZED HEALTH CARE EDUCATION/TRAINING PROGRAM

## 2020-02-27 PROCEDURE — 77030009848 HC PASSR SUT SET COOP -C: Performed by: STUDENT IN AN ORGANIZED HEALTH CARE EDUCATION/TRAINING PROGRAM

## 2020-02-27 PROCEDURE — 0UNG4ZZ RELEASE VAGINA, PERCUTANEOUS ENDOSCOPIC APPROACH: ICD-10-PCS | Performed by: SURGERY

## 2020-02-27 PROCEDURE — 76010000132 HC OR TIME 2.5 TO 3 HR: Performed by: STUDENT IN AN ORGANIZED HEALTH CARE EDUCATION/TRAINING PROGRAM

## 2020-02-27 PROCEDURE — 77030008756 HC TU IRR SUC STRY -B: Performed by: STUDENT IN AN ORGANIZED HEALTH CARE EDUCATION/TRAINING PROGRAM

## 2020-02-27 PROCEDURE — 0DNU4ZZ RELEASE OMENTUM, PERCUTANEOUS ENDOSCOPIC APPROACH: ICD-10-PCS | Performed by: STUDENT IN AN ORGANIZED HEALTH CARE EDUCATION/TRAINING PROGRAM

## 2020-02-27 PROCEDURE — 77030040504 HC DRN WND MDII -B: Performed by: STUDENT IN AN ORGANIZED HEALTH CARE EDUCATION/TRAINING PROGRAM

## 2020-02-27 PROCEDURE — 77030010507 HC ADH SKN DERMBND J&J -B: Performed by: STUDENT IN AN ORGANIZED HEALTH CARE EDUCATION/TRAINING PROGRAM

## 2020-02-27 PROCEDURE — 77030018836 HC SOL IRR NACL ICUM -A: Performed by: STUDENT IN AN ORGANIZED HEALTH CARE EDUCATION/TRAINING PROGRAM

## 2020-02-27 PROCEDURE — 77030040361 HC SLV COMPR DVT MDII -B

## 2020-02-27 PROCEDURE — 76060000036 HC ANESTHESIA 2.5 TO 3 HR: Performed by: STUDENT IN AN ORGANIZED HEALTH CARE EDUCATION/TRAINING PROGRAM

## 2020-02-27 PROCEDURE — 77030040922 HC BLNKT HYPOTHRM STRY -A

## 2020-02-27 PROCEDURE — 76210000016 HC OR PH I REC 1 TO 1.5 HR: Performed by: STUDENT IN AN ORGANIZED HEALTH CARE EDUCATION/TRAINING PROGRAM

## 2020-02-27 PROCEDURE — 77030007955 HC PCH ENDOSC SPEC J&J -B: Performed by: STUDENT IN AN ORGANIZED HEALTH CARE EDUCATION/TRAINING PROGRAM

## 2020-02-27 PROCEDURE — 77030019905 HC CATH URETH INTMIT MDII -A: Performed by: STUDENT IN AN ORGANIZED HEALTH CARE EDUCATION/TRAINING PROGRAM

## 2020-02-27 PROCEDURE — 77030012770 HC TRCR OPT FX AMR -B: Performed by: STUDENT IN AN ORGANIZED HEALTH CARE EDUCATION/TRAINING PROGRAM

## 2020-02-27 PROCEDURE — 77030003028 HC SUT VCRL J&J -A: Performed by: STUDENT IN AN ORGANIZED HEALTH CARE EDUCATION/TRAINING PROGRAM

## 2020-02-27 PROCEDURE — 77030008606 HC TRCR ENDOSC KII AMR -B: Performed by: STUDENT IN AN ORGANIZED HEALTH CARE EDUCATION/TRAINING PROGRAM

## 2020-02-27 PROCEDURE — 77030033639 HC SHR ENDO COAG HARM 36 J&J -E: Performed by: STUDENT IN AN ORGANIZED HEALTH CARE EDUCATION/TRAINING PROGRAM

## 2020-02-27 PROCEDURE — 77030018846 HC SOL IRR STRL H20 ICUM -A: Performed by: STUDENT IN AN ORGANIZED HEALTH CARE EDUCATION/TRAINING PROGRAM

## 2020-02-27 PROCEDURE — 77030011640 HC PAD GRND REM COVD -A: Performed by: STUDENT IN AN ORGANIZED HEALTH CARE EDUCATION/TRAINING PROGRAM

## 2020-02-27 PROCEDURE — 77030008684 HC TU ET CUF COVD -B: Performed by: ANESTHESIOLOGY

## 2020-02-27 PROCEDURE — 77030026438 HC STYL ET INTUB CARD -A: Performed by: ANESTHESIOLOGY

## 2020-02-27 PROCEDURE — 74011250636 HC RX REV CODE- 250/636: Performed by: ANESTHESIOLOGY

## 2020-02-27 PROCEDURE — 77030027138 HC INCENT SPIROMETER -A

## 2020-02-27 PROCEDURE — 77030035236 HC SUT PDS STRATFX BARB J&J -B: Performed by: STUDENT IN AN ORGANIZED HEALTH CARE EDUCATION/TRAINING PROGRAM

## 2020-02-27 PROCEDURE — 77030020829: Performed by: STUDENT IN AN ORGANIZED HEALTH CARE EDUCATION/TRAINING PROGRAM

## 2020-02-27 PROCEDURE — 77030031139 HC SUT VCRL2 J&J -A: Performed by: STUDENT IN AN ORGANIZED HEALTH CARE EDUCATION/TRAINING PROGRAM

## 2020-02-27 PROCEDURE — 74011250636 HC RX REV CODE- 250/636: Performed by: STUDENT IN AN ORGANIZED HEALTH CARE EDUCATION/TRAINING PROGRAM

## 2020-02-27 PROCEDURE — 74011250636 HC RX REV CODE- 250/636: Performed by: NURSE ANESTHETIST, CERTIFIED REGISTERED

## 2020-02-27 PROCEDURE — 77030009851 HC PCH RTVR ENDOSC AMR -B: Performed by: STUDENT IN AN ORGANIZED HEALTH CARE EDUCATION/TRAINING PROGRAM

## 2020-02-27 PROCEDURE — 0UQG7ZZ REPAIR VAGINA, VIA NATURAL OR ARTIFICIAL OPENING: ICD-10-PCS | Performed by: STUDENT IN AN ORGANIZED HEALTH CARE EDUCATION/TRAINING PROGRAM

## 2020-02-27 PROCEDURE — 74011000258 HC RX REV CODE- 258: Performed by: STUDENT IN AN ORGANIZED HEALTH CARE EDUCATION/TRAINING PROGRAM

## 2020-02-27 PROCEDURE — 77030035029 HC NDL INSUF VERES DISP COVD -B: Performed by: STUDENT IN AN ORGANIZED HEALTH CARE EDUCATION/TRAINING PROGRAM

## 2020-02-27 PROCEDURE — 74011000250 HC RX REV CODE- 250: Performed by: NURSE ANESTHETIST, CERTIFIED REGISTERED

## 2020-02-27 PROCEDURE — 77030002933 HC SUT MCRYL J&J -A: Performed by: STUDENT IN AN ORGANIZED HEALTH CARE EDUCATION/TRAINING PROGRAM

## 2020-02-27 PROCEDURE — 77030005513 HC CATH URETH FOL11 MDII -B: Performed by: STUDENT IN AN ORGANIZED HEALTH CARE EDUCATION/TRAINING PROGRAM

## 2020-02-27 RX ORDER — SODIUM CHLORIDE, SODIUM LACTATE, POTASSIUM CHLORIDE, CALCIUM CHLORIDE 600; 310; 30; 20 MG/100ML; MG/100ML; MG/100ML; MG/100ML
75 INJECTION, SOLUTION INTRAVENOUS CONTINUOUS
Status: DISPENSED | OUTPATIENT
Start: 2020-02-27 | End: 2020-02-28

## 2020-02-27 RX ORDER — BUPIVACAINE HYDROCHLORIDE AND EPINEPHRINE 5; 5 MG/ML; UG/ML
INJECTION, SOLUTION EPIDURAL; INTRACAUDAL; PERINEURAL AS NEEDED
Status: DISCONTINUED | OUTPATIENT
Start: 2020-02-27 | End: 2020-02-27 | Stop reason: HOSPADM

## 2020-02-27 RX ORDER — HYDROMORPHONE HYDROCHLORIDE 2 MG/ML
INJECTION, SOLUTION INTRAMUSCULAR; INTRAVENOUS; SUBCUTANEOUS AS NEEDED
Status: DISCONTINUED | OUTPATIENT
Start: 2020-02-27 | End: 2020-02-27 | Stop reason: HOSPADM

## 2020-02-27 RX ORDER — NEOSTIGMINE METHYLSULFATE 1 MG/ML
INJECTION INTRAVENOUS AS NEEDED
Status: DISCONTINUED | OUTPATIENT
Start: 2020-02-27 | End: 2020-02-27 | Stop reason: HOSPADM

## 2020-02-27 RX ORDER — SODIUM CHLORIDE, SODIUM LACTATE, POTASSIUM CHLORIDE, CALCIUM CHLORIDE 600; 310; 30; 20 MG/100ML; MG/100ML; MG/100ML; MG/100ML
125 INJECTION, SOLUTION INTRAVENOUS CONTINUOUS
Status: DISCONTINUED | OUTPATIENT
Start: 2020-02-27 | End: 2020-02-27 | Stop reason: HOSPADM

## 2020-02-27 RX ORDER — SODIUM CHLORIDE, SODIUM LACTATE, POTASSIUM CHLORIDE, CALCIUM CHLORIDE 600; 310; 30; 20 MG/100ML; MG/100ML; MG/100ML; MG/100ML
INJECTION, SOLUTION INTRAVENOUS
Status: DISCONTINUED | OUTPATIENT
Start: 2020-02-27 | End: 2020-02-27 | Stop reason: HOSPADM

## 2020-02-27 RX ORDER — ONDANSETRON 2 MG/ML
INJECTION INTRAMUSCULAR; INTRAVENOUS AS NEEDED
Status: DISCONTINUED | OUTPATIENT
Start: 2020-02-27 | End: 2020-02-27 | Stop reason: HOSPADM

## 2020-02-27 RX ORDER — LIDOCAINE HYDROCHLORIDE 10 MG/ML
0.1 INJECTION, SOLUTION EPIDURAL; INFILTRATION; INTRACAUDAL; PERINEURAL AS NEEDED
Status: DISCONTINUED | OUTPATIENT
Start: 2020-02-27 | End: 2020-02-27

## 2020-02-27 RX ORDER — ROCURONIUM BROMIDE 10 MG/ML
INJECTION, SOLUTION INTRAVENOUS AS NEEDED
Status: DISCONTINUED | OUTPATIENT
Start: 2020-02-27 | End: 2020-02-27 | Stop reason: HOSPADM

## 2020-02-27 RX ORDER — ONDANSETRON 2 MG/ML
4 INJECTION INTRAMUSCULAR; INTRAVENOUS AS NEEDED
Status: DISCONTINUED | OUTPATIENT
Start: 2020-02-27 | End: 2020-02-27 | Stop reason: HOSPADM

## 2020-02-27 RX ORDER — FENTANYL CITRATE 50 UG/ML
INJECTION, SOLUTION INTRAMUSCULAR; INTRAVENOUS AS NEEDED
Status: DISCONTINUED | OUTPATIENT
Start: 2020-02-27 | End: 2020-02-27 | Stop reason: HOSPADM

## 2020-02-27 RX ORDER — HYDROMORPHONE HYDROCHLORIDE 1 MG/ML
.25-1 INJECTION, SOLUTION INTRAMUSCULAR; INTRAVENOUS; SUBCUTANEOUS
Status: DISCONTINUED | OUTPATIENT
Start: 2020-02-27 | End: 2020-02-27 | Stop reason: HOSPADM

## 2020-02-27 RX ORDER — DIPHENHYDRAMINE HYDROCHLORIDE 50 MG/ML
12.5 INJECTION, SOLUTION INTRAMUSCULAR; INTRAVENOUS AS NEEDED
Status: DISCONTINUED | OUTPATIENT
Start: 2020-02-27 | End: 2020-02-27 | Stop reason: HOSPADM

## 2020-02-27 RX ORDER — PROPOFOL 10 MG/ML
INJECTION, EMULSION INTRAVENOUS AS NEEDED
Status: DISCONTINUED | OUTPATIENT
Start: 2020-02-27 | End: 2020-02-27 | Stop reason: HOSPADM

## 2020-02-27 RX ORDER — GLYCOPYRROLATE 0.2 MG/ML
INJECTION INTRAMUSCULAR; INTRAVENOUS AS NEEDED
Status: DISCONTINUED | OUTPATIENT
Start: 2020-02-27 | End: 2020-02-27 | Stop reason: HOSPADM

## 2020-02-27 RX ORDER — MIDAZOLAM HYDROCHLORIDE 1 MG/ML
INJECTION, SOLUTION INTRAMUSCULAR; INTRAVENOUS AS NEEDED
Status: DISCONTINUED | OUTPATIENT
Start: 2020-02-27 | End: 2020-02-27 | Stop reason: HOSPADM

## 2020-02-27 RX ORDER — SODIUM CHLORIDE 0.9 % (FLUSH) 0.9 %
5-40 SYRINGE (ML) INJECTION AS NEEDED
Status: DISCONTINUED | OUTPATIENT
Start: 2020-02-27 | End: 2020-02-27 | Stop reason: HOSPADM

## 2020-02-27 RX ORDER — HYDROMORPHONE HYDROCHLORIDE 2 MG/ML
1 INJECTION, SOLUTION INTRAMUSCULAR; INTRAVENOUS; SUBCUTANEOUS
Status: DISCONTINUED | OUTPATIENT
Start: 2020-02-27 | End: 2020-02-28

## 2020-02-27 RX ORDER — DEXAMETHASONE SODIUM PHOSPHATE 4 MG/ML
INJECTION, SOLUTION INTRA-ARTICULAR; INTRALESIONAL; INTRAMUSCULAR; INTRAVENOUS; SOFT TISSUE AS NEEDED
Status: DISCONTINUED | OUTPATIENT
Start: 2020-02-27 | End: 2020-02-27 | Stop reason: HOSPADM

## 2020-02-27 RX ORDER — LIDOCAINE HYDROCHLORIDE 20 MG/ML
INJECTION, SOLUTION EPIDURAL; INFILTRATION; INTRACAUDAL; PERINEURAL AS NEEDED
Status: DISCONTINUED | OUTPATIENT
Start: 2020-02-27 | End: 2020-02-27 | Stop reason: HOSPADM

## 2020-02-27 RX ORDER — KETOROLAC TROMETHAMINE 30 MG/ML
30 INJECTION, SOLUTION INTRAMUSCULAR; INTRAVENOUS
Status: DISCONTINUED | OUTPATIENT
Start: 2020-02-27 | End: 2020-03-02 | Stop reason: HOSPADM

## 2020-02-27 RX ORDER — SODIUM CHLORIDE 0.9 % (FLUSH) 0.9 %
5-40 SYRINGE (ML) INJECTION EVERY 8 HOURS
Status: DISCONTINUED | OUTPATIENT
Start: 2020-02-27 | End: 2020-02-27 | Stop reason: HOSPADM

## 2020-02-27 RX ADMIN — PIPERACILLIN AND TAZOBACTAM 3.38 G: 3; .375 INJECTION, POWDER, LYOPHILIZED, FOR SOLUTION INTRAVENOUS at 05:54

## 2020-02-27 RX ADMIN — FENTANYL CITRATE 50 MCG: 50 INJECTION INTRAMUSCULAR; INTRAVENOUS at 13:39

## 2020-02-27 RX ADMIN — GLYCOPYRROLATE 0.4 MG: 0.2 INJECTION, SOLUTION INTRAMUSCULAR; INTRAVENOUS at 15:41

## 2020-02-27 RX ADMIN — Medication 10 ML: at 23:54

## 2020-02-27 RX ADMIN — PIPERACILLIN AND TAZOBACTAM 3.38 G: 3; .375 INJECTION, POWDER, LYOPHILIZED, FOR SOLUTION INTRAVENOUS at 23:54

## 2020-02-27 RX ADMIN — SODIUM CHLORIDE, POTASSIUM CHLORIDE, SODIUM LACTATE AND CALCIUM CHLORIDE: 600; 310; 30; 20 INJECTION, SOLUTION INTRAVENOUS at 13:06

## 2020-02-27 RX ADMIN — Medication 10 ML: at 17:25

## 2020-02-27 RX ADMIN — MIDAZOLAM HYDROCHLORIDE 1 MG: 2 INJECTION, SOLUTION INTRAMUSCULAR; INTRAVENOUS at 12:56

## 2020-02-27 RX ADMIN — ONDANSETRON 4 MG: 2 INJECTION INTRAMUSCULAR; INTRAVENOUS at 15:39

## 2020-02-27 RX ADMIN — LIDOCAINE HYDROCHLORIDE 60 MG: 20 INJECTION, SOLUTION INTRAVENOUS at 13:00

## 2020-02-27 RX ADMIN — FENTANYL CITRATE 50 MCG: 50 INJECTION INTRAMUSCULAR; INTRAVENOUS at 13:22

## 2020-02-27 RX ADMIN — ROCURONIUM BROMIDE 40 MG: 50 INJECTION, SOLUTION INTRAVENOUS at 13:00

## 2020-02-27 RX ADMIN — KETOROLAC TROMETHAMINE 30 MG: 30 INJECTION, SOLUTION INTRAMUSCULAR at 17:35

## 2020-02-27 RX ADMIN — SODIUM CHLORIDE, SODIUM LACTATE, POTASSIUM CHLORIDE, AND CALCIUM CHLORIDE 75 ML/HR: 600; 310; 30; 20 INJECTION, SOLUTION INTRAVENOUS at 20:29

## 2020-02-27 RX ADMIN — Medication 10 ML: at 17:24

## 2020-02-27 RX ADMIN — SODIUM CHLORIDE, POTASSIUM CHLORIDE, SODIUM LACTATE AND CALCIUM CHLORIDE: 600; 310; 30; 20 INJECTION, SOLUTION INTRAVENOUS at 12:54

## 2020-02-27 RX ADMIN — NEOSTIGMINE METHYLSULFATE 3 MG: 1 INJECTION, SOLUTION INTRAVENOUS at 15:41

## 2020-02-27 RX ADMIN — HYDROMORPHONE HYDROCHLORIDE 1 MG: 2 INJECTION INTRAMUSCULAR; INTRAVENOUS; SUBCUTANEOUS at 15:57

## 2020-02-27 RX ADMIN — FENTANYL CITRATE 50 MCG: 50 INJECTION INTRAMUSCULAR; INTRAVENOUS at 12:54

## 2020-02-27 RX ADMIN — Medication 10 ML: at 05:54

## 2020-02-27 RX ADMIN — PIPERACILLIN AND TAZOBACTAM 3.38 G: 3; .375 INJECTION, POWDER, LYOPHILIZED, FOR SOLUTION INTRAVENOUS at 14:00

## 2020-02-27 RX ADMIN — DEXAMETHASONE SODIUM PHOSPHATE 8 MG: 4 INJECTION, SOLUTION INTRAMUSCULAR; INTRAVENOUS at 13:26

## 2020-02-27 RX ADMIN — HYDROMORPHONE HYDROCHLORIDE 0.5 MG: 2 INJECTION INTRAMUSCULAR; INTRAVENOUS; SUBCUTANEOUS at 15:14

## 2020-02-27 RX ADMIN — MIDAZOLAM HYDROCHLORIDE 3 MG: 2 INJECTION, SOLUTION INTRAMUSCULAR; INTRAVENOUS at 12:54

## 2020-02-27 RX ADMIN — FENTANYL CITRATE 50 MCG: 50 INJECTION INTRAMUSCULAR; INTRAVENOUS at 14:21

## 2020-02-27 RX ADMIN — KETOROLAC TROMETHAMINE 30 MG: 30 INJECTION, SOLUTION INTRAMUSCULAR at 23:54

## 2020-02-27 RX ADMIN — PROPOFOL 200 MG: 10 INJECTION, EMULSION INTRAVENOUS at 13:00

## 2020-02-27 RX ADMIN — ROCURONIUM BROMIDE 10 MG: 50 INJECTION, SOLUTION INTRAVENOUS at 14:08

## 2020-02-27 RX ADMIN — HYDROMORPHONE HYDROCHLORIDE 0.5 MG: 2 INJECTION INTRAMUSCULAR; INTRAVENOUS; SUBCUTANEOUS at 15:43

## 2020-02-27 RX ADMIN — FENTANYL CITRATE 50 MCG: 50 INJECTION INTRAMUSCULAR; INTRAVENOUS at 12:56

## 2020-02-27 NOTE — ANESTHESIA POSTPROCEDURE EVALUATION
Procedure(s):  DIAGNOSTIC LAPAROSCOPY,  LYSIS OF ADHESIONS CLOSURE OF VAGINAL CUFF. general    Anesthesia Post Evaluation        Patient location during evaluation: PACU  Level of consciousness: awake  Pain management: adequate  Airway patency: patent  Anesthetic complications: no  Cardiovascular status: acceptable  Respiratory status: acceptable  Hydration status: acceptable  Post anesthesia nausea and vomiting:  none      Vitals Value Taken Time   /89 2/27/2020  4:45 PM   Temp 36.3 °C (97.4 °F) 2/27/2020  4:02 PM   Pulse 73 2/27/2020  4:46 PM   Resp 10 2/27/2020  4:46 PM   SpO2 100 % 2/27/2020  4:46 PM   Vitals shown include unvalidated device data.

## 2020-02-27 NOTE — PERIOP NOTES
TRANSFER - OUT REPORT:    Verbal report given to Cruz Sequeira on Ramy Lao  being transferred to  for routine progression of care       Report consisted of patients Situation, Background, Assessment and   Recommendations(SBAR). Information from the following report(s) OR Summary, Procedure Summary, Intake/Output and Cardiac Rhythm nsr was reviewed with the receiving nurse. Lines:   Peripheral IV 02/26/20 Right Antecubital (Active)   Site Assessment Clean, dry, & intact 2/27/2020  3:56 PM   Phlebitis Assessment 0 2/27/2020  3:56 PM   Infiltration Assessment 0 2/27/2020  3:56 PM   Dressing Status Clean, dry, & intact 2/27/2020  3:56 PM   Dressing Type Transparent 2/27/2020  3:56 PM   Hub Color/Line Status Pink 2/27/2020  3:56 PM   Action Taken Open ports on tubing capped 2/27/2020  3:56 PM   Alcohol Cap Used Yes 2/27/2020  3:56 PM       Peripheral IV 02/27/20 Left Arm (Active)   Site Assessment Clean, dry, & intact 2/27/2020  3:56 PM   Phlebitis Assessment 0 2/27/2020  3:56 PM   Infiltration Assessment 0 2/27/2020  3:56 PM   Dressing Status Clean, dry, & intact 2/27/2020  3:56 PM   Dressing Type Transparent 2/27/2020  3:56 PM   Hub Color/Line Status Pink 2/27/2020  3:56 PM   Action Taken Open ports on tubing capped 2/27/2020  3:56 PM   Alcohol Cap Used Yes 2/27/2020  3:56 PM        Opportunity for questions and clarification was provided.       Patient transported with:   Registered Nurse

## 2020-02-27 NOTE — PERIOP NOTES
MD spoke with family just before I went to waiting room and found no family. Pt probably returned to pt's room. Plastic Surgery Progress Note    Subjective/Interval History:  Somewhat confused this morning. Head CT without any intracranial pathology.     Objective:  Temp:  [98  F (36.7  C)-99.9  F (37.7  C)] 98.7  F (37.1  C)  Heart Rate:  [56-66] 62  Resp:  [11-20] 12  BP: ()/(34-91) 137/46  FiO2 (%):  [40 %-50 %] 40 %  SpO2:  [89 %-100 %] 99 %    General appearance: in NAD  Pulm: Non-labored breathing  CV: Hemodynamically stable  Abd: Soft, appropriately TTP, midline incision clean and intact with staples in place, some drainage from lower aspect of incision, RIVKA drains with serosanguinous output, ostomy pink and viable, stents in place.  Skin: warm and well-perfused.     Assessment/Plan:   Sunitha Jacques is a 77 year old female with history of bladder cancer now s/p anterior pelvic exenteration, pelvic lymph node dissection, ileal conduit and VRAM without skin paddle to fill pelvic defect.     - No need for flap checks.   - Will manage subcutaneous drain outpatient.     Trent Freed, PGY4  [phone]

## 2020-02-27 NOTE — PERIOP NOTES
SBAR given to [employer] RN. No further questions. Dr. Sanna Little updated on pt status. Infomed of 700 cc FRANCIS output and stated to be expected d/t flush/washout. No other orders.

## 2020-02-27 NOTE — PROGRESS NOTES
Problem: Falls - Risk of  Goal: *Absence of Falls  Description  Document Rubia Blackwell Fall Risk and appropriate interventions in the flowsheet.   Outcome: Progressing Towards Goal  Note: Fall Risk Interventions:         Problem: Pain  Goal: *Control of Pain  Outcome: Progressing Towards Goal

## 2020-02-27 NOTE — PROGRESS NOTES
Gynecology Progress Note    S: Patient with no complaints this AM. Denies fever, chills, significant abd pain. Still having some blood tinged discharge from her vagina. NPO since midnight for OR today     O:  Vitals:  Blood pressure 95/62, pulse 76, temperature 97.7 °F (36.5 °C), resp. rate 18, SpO2 98 %. Temp (24hrs), Av.7 °F (36.5 °C), Min:97.7 °F (36.5 °C), Max:97.7 °F (36.5 °C)        Exam:    Gen:  No acute distress  CV: Regular rate and rhythm. Normal S1, S2  Chest: Clear to auscultation bilaterally. Abdomen: Soft,  Non-tender  Incision (s): Clean, dry and intact without erythema. Lower extremities: Negative for swelling, cords, or tenderness. Labs:   Recent Results (from the past 24 hour(s))   METABOLIC PANEL, BASIC    Collection Time: 20  4:26 PM   Result Value Ref Range    Sodium 140 136 - 145 mmol/L    Potassium 3.5 3.5 - 5.1 mmol/L    Chloride 106 97 - 108 mmol/L    CO2 29 21 - 32 mmol/L    Anion gap 5 5 - 15 mmol/L    Glucose 92 65 - 100 mg/dL    BUN 8 6 - 20 MG/DL    Creatinine 0.82 0.55 - 1.02 MG/DL    BUN/Creatinine ratio 10 (L) 12 - 20      GFR est AA >60 >60 ml/min/1.73m2    GFR est non-AA >60 >60 ml/min/1.73m2    Calcium 9.0 8.5 - 10.1 MG/DL   CBC WITH AUTOMATED DIFF    Collection Time: 20  4:26 PM   Result Value Ref Range    WBC 14.9 (H) 3.6 - 11.0 K/uL    RBC 4.56 3.80 - 5.20 M/uL    HGB 13.4 11.5 - 16.0 g/dL    HCT 40.1 35.0 - 47.0 %    MCV 87.9 80.0 - 99.0 FL    MCH 29.4 26.0 - 34.0 PG    MCHC 33.4 30.0 - 36.5 g/dL    RDW 13.4 11.5 - 14.5 %    PLATELET 717 (H) 599 - 400 K/uL    MPV 10.7 8.9 - 12.9 FL    NRBC 0.0 0  WBC    ABSOLUTE NRBC 0.00 0.00 - 0.01 K/uL    NEUTROPHILS 68 32 - 75 %    LYMPHOCYTES 23 12 - 49 %    MONOCYTES 5 5 - 13 %    EOSINOPHILS 1 0 - 7 %    BASOPHILS 1 0 - 1 %    IMMATURE GRANULOCYTES 1 (H) 0.0 - 0.5 %    ABS. NEUTROPHILS 10.2 (H) 1.8 - 8.0 K/UL    ABS. LYMPHOCYTES 3.5 0.8 - 3.5 K/UL    ABS. MONOCYTES 0.8 0.0 - 1.0 K/UL    ABS. EOSINOPHILS 0.2 0.0 - 0.4 K/UL    ABS. BASOPHILS 0.1 0.0 - 0.1 K/UL    ABS. IMM. GRANS. 0.2 (H) 0.00 - 0.04 K/UL    DF AUTOMATED         Assessment and Plan: 33yo G0 POD 15 s/p CIELO, admitted with vaginal cuff dehiscence     Non-toxic appearing   Plan to go to OR at 12:30 for Dx laparoscopy and repair of vaginal cuff -consent reviewed, discussed possible need for open incision if complications related to bowel are present, all questions answered.    Continue [id number] [facility], DO

## 2020-02-27 NOTE — PROGRESS NOTES
Problem: Falls - Risk of  Goal: *Absence of Falls  Description  Document Veronika Jaeger Fall Risk and appropriate interventions in the flowsheet.   Outcome: Progressing Towards Goal  Note: Fall Risk Interventions:

## 2020-02-27 NOTE — BRIEF OP NOTE
BRIEF OPERATIVE NOTE    Date of Procedure: 2/27/2020   Preoperative Diagnosis: VAGINAL CUFF DEHISCENCE POST OPERATIVE INFECTION  Postoperative Diagnosis:      Procedure(s):  DIAGNOSTIC LAPAROSCOPY,  LYSIS OF ADHESIONS CLOSURE OF VAGINAL CUFF  Surgeon(s) and Role:     * Deb New DO - Primary     * Suhas Dwyer MD - Assisting     * Larissa Dillard MD         Surgical Assistant: none    Surgical Staff:  Circ-1: Earl Thompson RN  Scrub Tech-1: Leighann Garvey  Surg Asst-1: Steffi Starr  Event Time In Time Out   Incision Start 02/27/2020 1327    Incision Close       Anesthesia: General   Estimated Blood Loss: 10cc  Specimens: * No specimens in log *   Findings: Dense small bowel and sigmoid adhesions to vaginal cuff  Complications: none  Implants: * No implants in log *     Anticipate ileus, go slow with po, for increased pain/fever/WBC, slow threshold to do po/IV contrast CT to r/o leak, will f/u in AM

## 2020-02-27 NOTE — BRIEF OP NOTE
BRIEF OPERATIVE NOTE    Date of Procedure: 2/27/2020   Preoperative Diagnosis: VAGINAL CUFF DEHISCENCE POST OPERATIVE INFECTION  Postoperative Diagnosis:  VAGINAL CUFF DEHISCENCE POST OPERATIVE INFECTION    Procedure(s):  DIAGNOSTIC LAPAROSCOPY,  LYSIS OF ADHESIONS CLOSURE OF VAGINAL CUFF  Surgeon(s) and Role:     * Leopold Brands, DO - Primary     * Joshua Castellanos MD - [address] [facility], [address] Obinna Cordova MD         Surgical Assistant: Alan Rodriguez MD     Surgical Staff:  Circ-1: Betsy Monzon RN  Circ-Relief: David Man RN; Wiley Yu RN  Scrub Tech-1: Georgia Fuchs  Scrub Tech-Relief: Sachin Noyola  Surg Asst-1: Dejuan Miranda  Event Time In Time Out   Incision Start 02/27/2020 1327    Incision Close 02/27/2020 1546      Anesthesia: General   Estimated Blood Loss: 20cc, IVF 1500cc crystalloid, UOP 100cc   Specimens: None  Findings: 3cm opening of vaginal cuff with purulent drainage, dense adhesions of small bowel and sigmoid colon to the vaginal cuff which were taken down by Dr. Merly Flores.    Complications: None  Implants: * No implants in log *

## 2020-02-28 LAB
BASOPHILS # BLD: 0.1 K/UL (ref 0–0.1)
BASOPHILS NFR BLD: 0 % (ref 0–1)
DIFFERENTIAL METHOD BLD: ABNORMAL
EOSINOPHIL # BLD: 0 K/UL (ref 0–0.4)
EOSINOPHIL NFR BLD: 0 % (ref 0–7)
ERYTHROCYTE [DISTWIDTH] IN BLOOD BY AUTOMATED COUNT: 13.4 % (ref 11.5–14.5)
HCT VFR BLD AUTO: 39.3 % (ref 35–47)
HGB BLD-MCNC: 12.8 G/DL (ref 11.5–16)
IMM GRANULOCYTES # BLD AUTO: 0.2 K/UL (ref 0–0.04)
IMM GRANULOCYTES NFR BLD AUTO: 1 % (ref 0–0.5)
LYMPHOCYTES # BLD: 2.3 K/UL (ref 0.8–3.5)
LYMPHOCYTES NFR BLD: 11 % (ref 12–49)
MCH RBC QN AUTO: 28.9 PG (ref 26–34)
MCHC RBC AUTO-ENTMCNC: 32.6 G/DL (ref 30–36.5)
MCV RBC AUTO: 88.7 FL (ref 80–99)
MONOCYTES # BLD: 0.6 K/UL (ref 0–1)
MONOCYTES NFR BLD: 3 % (ref 5–13)
NEUTS SEG # BLD: 17.2 K/UL (ref 1.8–8)
NEUTS SEG NFR BLD: 85 % (ref 32–75)
NRBC # BLD: 0 K/UL (ref 0–0.01)
NRBC BLD-RTO: 0 PER 100 WBC
PLATELET # BLD AUTO: 514 K/UL (ref 150–400)
PMV BLD AUTO: 10 FL (ref 8.9–12.9)
RBC # BLD AUTO: 4.43 M/UL (ref 3.8–5.2)
WBC # BLD AUTO: 20.3 K/UL (ref 3.6–11)

## 2020-02-28 PROCEDURE — 74011250636 HC RX REV CODE- 250/636: Performed by: STUDENT IN AN ORGANIZED HEALTH CARE EDUCATION/TRAINING PROGRAM

## 2020-02-28 PROCEDURE — 74011000258 HC RX REV CODE- 258: Performed by: STUDENT IN AN ORGANIZED HEALTH CARE EDUCATION/TRAINING PROGRAM

## 2020-02-28 PROCEDURE — 85025 COMPLETE CBC W/AUTO DIFF WBC: CPT

## 2020-02-28 PROCEDURE — 65270000029 HC RM PRIVATE

## 2020-02-28 PROCEDURE — 99218 HC RM OBSERVATION: CPT

## 2020-02-28 PROCEDURE — 74011250636 HC RX REV CODE- 250/636: Performed by: SURGERY

## 2020-02-28 PROCEDURE — 36415 COLL VENOUS BLD VENIPUNCTURE: CPT

## 2020-02-28 PROCEDURE — 74011250636 HC RX REV CODE- 250/636: Performed by: ANESTHESIOLOGY

## 2020-02-28 RX ORDER — HYDROMORPHONE HYDROCHLORIDE 1 MG/ML
1 INJECTION, SOLUTION INTRAMUSCULAR; INTRAVENOUS; SUBCUTANEOUS
Status: DISCONTINUED | OUTPATIENT
Start: 2020-02-28 | End: 2020-03-02 | Stop reason: HOSPADM

## 2020-02-28 RX ADMIN — Medication 10 ML: at 06:22

## 2020-02-28 RX ADMIN — PIPERACILLIN AND TAZOBACTAM 3.38 G: 3; .375 INJECTION, POWDER, LYOPHILIZED, FOR SOLUTION INTRAVENOUS at 14:53

## 2020-02-28 RX ADMIN — PIPERACILLIN AND TAZOBACTAM 3.38 G: 3; .375 INJECTION, POWDER, LYOPHILIZED, FOR SOLUTION INTRAVENOUS at 21:45

## 2020-02-28 RX ADMIN — SODIUM CHLORIDE, SODIUM LACTATE, POTASSIUM CHLORIDE, AND CALCIUM CHLORIDE 75 ML/HR: 600; 310; 30; 20 INJECTION, SOLUTION INTRAVENOUS at 10:11

## 2020-02-28 RX ADMIN — HYDROMORPHONE HYDROCHLORIDE 1 MG: 2 INJECTION, SOLUTION INTRAMUSCULAR; INTRAVENOUS; SUBCUTANEOUS at 10:11

## 2020-02-28 RX ADMIN — Medication 10 ML: at 21:45

## 2020-02-28 RX ADMIN — HYDROMORPHONE HYDROCHLORIDE 1 MG: 2 INJECTION, SOLUTION INTRAMUSCULAR; INTRAVENOUS; SUBCUTANEOUS at 06:21

## 2020-02-28 RX ADMIN — Medication 10 ML: at 15:32

## 2020-02-28 RX ADMIN — ONDANSETRON 4 MG: 2 INJECTION INTRAMUSCULAR; INTRAVENOUS at 14:52

## 2020-02-28 RX ADMIN — PIPERACILLIN AND TAZOBACTAM 3.38 G: 3; .375 INJECTION, POWDER, LYOPHILIZED, FOR SOLUTION INTRAVENOUS at 06:21

## 2020-02-28 RX ADMIN — SODIUM CHLORIDE 125 ML/HR: 900 INJECTION, SOLUTION INTRAVENOUS at 21:45

## 2020-02-28 RX ADMIN — PROCHLORPERAZINE EDISYLATE 5 MG: 5 INJECTION INTRAMUSCULAR; INTRAVENOUS at 19:16

## 2020-02-28 RX ADMIN — SODIUM CHLORIDE 125 ML/HR: 900 INJECTION, SOLUTION INTRAVENOUS at 15:23

## 2020-02-28 RX ADMIN — Medication 10 ML: at 06:21

## 2020-02-28 NOTE — PHYSICIAN ADVISORY
Letter of Status Determination:  
Recommend hospitalization status upgraded from OBSERVATION  to INPATIENT  Status Pt Name:  Isaak Bailey MR#  
CHU # [id number] / 
[id number] Payor: Naila Valdez / Plan: [address] HMO / Product Type: HMO /   
CSN#  [id number] Room and Hospital  410/01  @ [address] [facility] Hospitalization date  2/26/2020  2:50 PM  
Current Attending Physician  Merissa Louie,   
Principal diagnosis  Vaginal discharge Briseyda Pugh is a 36 y.o.  female with a history of abdominal hysterectomy and bilateral salpingectomy for an enlarged ~24wk size fibroid uterus done on 2/12/20. She returned to the office today for 2wk follow up and reported purulent vaginal discharge with blood tinge. She denies fever, chills, constipation, abdominal pain. Her incision is healing well. She had an episode of diarrhea on 2/20/20 and after that was when she first noticed purulent vaginal discharge. She has not put anything in her vagina, denies sexual intercourse. Underwent diagnostic lap, closure of vaginal cuff by Dr. Robert Lugo by Dr. James Terrell Poor po tolerance, CLD, abx [place] (MCG) criteria Does  NOT apply STATUS DETERMINATION  INPATIENT The final decision of the patient's hospitalization status depends on the attending physician's judgment Additional comments Payor: Naila Valdez / Plan: [employer] HMO / Product Type: HMO /   
  
 
Braulio Goodell MD 
Cell: [phone] Physician Advisor

## 2020-02-28 NOTE — PROGRESS NOTES
Bedside shift change report given to Caryle Aid (oncoming nurse) by Yahir Buck (offgoing nurse). Report included the following information SBAR, Kardex, OR Summary, Intake/Output, MAR and Recent Results.

## 2020-02-28 NOTE — OP NOTES
Pawel Magaña [facility] 80  OPERATIVE REPORT    Name:  Lisa Whitney  MR#:  [id number]  :  1985  ACCOUNT #:  [de-identified]  DATE OF SERVICE:  2020    PREOPERATIVE DIAGNOSES:  1. Vaginal cuff dehiscence. 2.  Intraabdominal adhesions. POSTOPERATIVE DIAGNOSIS:  same  PROCEDURE PERFORMED:  1. Laparoscopic extensive adhesiolysis. 2.  Repair of vaginal dehiscence by Juany Ford DO of GYN services. GYN SURGEON:  Dr. Edwin Harvey. GENERAL SURGEON:  HELENA Downing Plush:  None. ANESTHESIA:  General.    POSTOPERATIVE CONDITION: Good at the end of my portion of the procedure. COMPLICATIONS:  None. SPECIMENS REMOVED:  None. IMPLANTS:  None. ESTIMATED BLOOD LOSS:  Approximately 10 mL for my portion of the procedure. INTRAOPERATIVE FINDINGS:  Dense sigmoid colon and small bowel adhesions to the vaginal cuff. INDICATIONS FOR PROCEDURE:  This is a 71-year-old  woman who underwent previous hysterectomy for fibroids and had developed signs of purulent drainage from the vagina and discovered to have a 2-cm vaginal cuff dehiscence. She was brought for closure of the dehiscence by Dr. Brandon Martines. Intraoperatively, she was found to have dense bowel adhesions to the vaginal cuff. General Surgery consultation was requested. DESCRIPTION OF PROCEDURE:  The patient is already at sleep with laparoscopic trocars in place and was sterilely prepped and draped in the OR-4 when the consult was requested. I discussed findings and initial diagnostic laparoscopy with Dr. Brandon Martines. At that time, I scrubbed in and as mentioned previously, there were dense adhesions of a small bowel loop and a portion of the sigmoid colon to the vaginal cuff. I started by lysing the adhesions on the patient's left aspect of the vaginal cuff to mobilize the sigmoid colon.   This was done very gently using blunt dissection with the suction  and this portion actually peeled off very easily and there, you could easily the opening in the vaginal cuff with the bulb syringe placed by the gynecologic surgeon in place. At that time, I turned my attention to the small bowel loop, which appeared to be more densely adherent and it was stuck to the more right lateral aspect of the patient's cuff and it was also attached to the patient's right ovary and tube where it had been mobilized off the uterus. It was gently mobilized medially, starting in the right lower quadrant using the Harmonic wei taking care to err on the side of the tube and the ovary and the vaginal cuff and avoid injury to the bowel wall. At no time was the serosa felt to be violated. It was gently mobilized from right to left and then there were a few small adhesions to the  more middle portion of the patient's vaginal cuff and these were divided by taking free the vaginal cuff from the bowel again using the Harmonic wei. All this occupied approximately 40 minutes of time and should not be considered and incidental event. At that time, the sites were irrigated and checked for hemostasis, any sign of bowel leak, and the case was turned back over to Dr. Lorenza Case.     Thank you for your kind referral.          Lilian Landa MD      [id number]  D:  02/27/2020 17:57  T:  02/28/2020 5:07  JOB #:  [id number]  CC:  Rosmery Sherman DO

## 2020-02-28 NOTE — PROGRESS NOTES
Gynecology Progress Note    S:  Pt feeling well. Says pain is well controlled, feels like \"I pulled a muscle\". Tolerating clears, no N&V. No flatus yet. [place] cath removed this AM. Has not been up out of bed. No fever or chills. O:  Vitals:  Blood pressure 124/81, pulse 65, temperature 98.6 °F (37 °C), resp. rate 16, height 5' 7\" (1.702 m), weight 79.1 kg (174 lb 6.1 oz), last menstrual period 2020, SpO2 97 %. Temp (24hrs), Av.9 °F (36.6 °C), Min:97.2 °F (36.2 °C), Max:98.6 °F (37 °C)      Output by Drain (mL) 20 0701 - 20 1900 20 190 - 20 0700 20 0701 - 20 1900 20 190 - 20 0700 20 0701 - 20 0801   Matthew Drain #1 20 Anterior Abdomen   740 80        Exam:    Gen:  No acute distress  CV: Regular rate and rhythm. Normal S1, S2  Chest: Clear to auscultation bilaterally. Abdomen: Soft,  Non-tender  Incision (s): Clean, dry and intact without erythema. Lower extremities: Negative for swelling, cords, or tenderness. Labs:   Recent Results (from the past 24 hour(s))   CBC WITH AUTOMATED DIFF    Collection Time: 20  7:12 AM   Result Value Ref Range    WBC 20.3 (H) 3.6 - 11.0 K/uL    RBC 4.43 3.80 - 5.20 M/uL    HGB 12.8 11.5 - 16.0 g/dL    HCT 39.3 35.0 - 47.0 %    MCV 88.7 80.0 - 99.0 FL    MCH 28.9 26.0 - 34.0 PG    MCHC 32.6 30.0 - 36.5 g/dL    RDW 13.4 11.5 - 14.5 %    PLATELET 798 (H) 164 - 400 K/uL    MPV 10.0 8.9 - 12.9 FL    NRBC 0.0 0  WBC    ABSOLUTE NRBC 0.00 0.00 - 0.01 K/uL    NEUTROPHILS 85 (H) 32 - 75 %    LYMPHOCYTES 11 (L) 12 - 49 %    MONOCYTES 3 (L) 5 - 13 %    EOSINOPHILS 0 0 - 7 %    BASOPHILS 0 0 - 1 %    IMMATURE GRANULOCYTES 1 (H) 0.0 - 0.5 %    ABS. NEUTROPHILS 17.2 (H) 1.8 - 8.0 K/UL    ABS. LYMPHOCYTES 2.3 0.8 - 3.5 K/UL    ABS. MONOCYTES 0.6 0.0 - 1.0 K/UL    ABS. EOSINOPHILS 0.0 0.0 - 0.4 K/UL    ABS. BASOPHILS 0.1 0.0 - 0.1 K/UL    ABS. IMM.  GRANS. 0.2 (H) 0.00 - 0.04 K/UL    DF AUTOMATED Assessment and Plan: 33yo G0 POD 16 s/p CIELO, admitted with vaginal cuff dehiscence/post op infection, now POD 1 s/p Dx laparoscopy, lysis of adhesions and closure of vaginal cuff     Non-toxic appearing   WBC 20 today, not completely unexpected after surgery yesterday, she is afebrile, repeat CBC in AM,   Continue Zosyn, Matthew drain putting out serosanguinous fluid (initial 700+cc was from irrigation left in abd at time of surgery), overnight only 80cc.      Consider pulling Matthew drain tomorrow if no significant drainage, no feculent material, and clinically improving     If remains afebrile next 24-48hrs can transition to po Bactrim and Flagyl to complete 14d of ABX therapy     Continue clear liquids for now, Dr. Keshia Obrien recommended going very slowly with diet   Encouraged ambulation to promote bowel function     Dmitri Morley, DO

## 2020-02-28 NOTE — PROGRESS NOTES
2/28/2020  11:12 AM  Reason for Admission: Elective - Vaginal cuss dehiscence requiring Surgery    RUR: 4%    Advance Directive: Not on file. Pt defers to [facility]. Assessment:    Age: 36    Sex: [] Male [x]Female     Residency: [x]Private residence []Apartment []Assisted Living []LTC []Other:   Exterior Steps: 4  Interior Steps: 0    Lives With: [x]With spouse []Other family members []Underage children []Alone []Care provider []Other:    Prior functioning:  [x]Independent []Dependent []Partial dependence   Pt requires assistance with: []Bathing []Dressing []Toileting []Ambulation     Prior DME required:  [x]None []RW []Cane []Crutches []Bedside commode []CPAP []Home O2 (Liter/Provider: ) []Nebulizer   []Shower Chair []Wheelchair []Hospital Bed [][place] []Stair lift []Rollator []Other:    DME available: [x]None []RW []Cane []Crutches []Bedside commode []CPAP []Home O2 (Liter/Provider: ) []Nebulizer   []Shower Chair []Wheelchair []Hospital Bed [][place] []Stair lift []Rollator []Other:    Rehab history: [x]None []Outpatient PT []Home Health (Provider/Date: ) []SNF (Provider/Date: ) []IPR (Provider/Date: ) []LTC (Provider/Date: )    Discharge Concerns: []Yes [x]No []Unknown   Describe: Insurer:  Abhay   Observation notice provided in writing to patient and/or caregiver as well as verbal explanation of the policy. Patients who are in outpatient status also receive the Observation notice. PCP: None - [facility] information provided as pt prefers to use urgent care. Name of Practice:   Current patient: []Yes []No   Approximate date of last visit:    Pharmacy: [facility], [address] Transport: Family        Transition of care plan:    []Unable to determine at this time. Awaiting clinical progress, and disposition recommendations. [x] Home with outpatient follow-up    [] Home with Outpatient PT and outpatient follow-up   Pt aware of OP appt?  []Yes, Provider:   []Not scheduled   Transport provider:     [] Home with family assistance as needed and outpatient follow-up   Family able to assist:    Schedule:  Transport provider:      [] Home with Home Health   - Provider:     []SNF/IPR   -[]Preferences given:   []Listing provided and preferences requested   -Status: []Pending []Accepted:    -Auth required: []Yes []No    -Auth initiated date:   -3 midnight stay required: []Yes []No  Date satisfied:     [] Other:     [place] MA    Care Management Interventions  PCP Verified by CM: Yes(None. [facility] information provided. )  Mode of Transport at Discharge:  Other (see comment)(Boyfriend)  MyChart Signup: No  Discharge Durable Medical Equipment: No  Physical Therapy Consult: No  Occupational Therapy Consult: No  Speech Therapy Consult: No  Current Support Network: Lives with Spouse  Confirm Follow Up Transport: Family  Discharge Location  Discharge Placement: Home with family assistance

## 2020-02-28 NOTE — OP NOTES
Pawel Magaña [facility] 80  OPERATIVE REPORT    Name:  Kwabena King  MR#:  [id number]  :  1985  ACCOUNT #:  [de-identified]  DATE OF SERVICE:  2020    PREOPERATIVE DIAGNOSIS:  Vaginal cuff dehiscence, postoperative infection. POSTOPERATIVE DIAGNOSIS:  Vaginal cuff dehiscence, postoperative infection. PROCEDURE PERFORMED:  Diagnostic laparoscopy, lysis of adhesions, and closure of vaginal cuff. SURGEONS:  Manisha Vick DO and Beatrice Moncada MD.    ASSISTANT:  Jake Wesley MD    ANESTHESIA:  General.    COMPLICATIONS:  None. SPECIMENS REMOVED:  None. IMPLANTS:  None    ESTIMATED BLOOD LOSS:  20 mL. IV FLUIDS:  1500 mL of crystalloid. URINE OUTPUT:  100 mL. FINDINGS:  A 3 cm opening in the vaginal cuff with purulent drainage. Dense adhesions of small bowel and sigmoid colon to the vaginal cuff, which were taken down by Dr. Lamar Couch. INDICATIONS:  The patient is a 81-year-old  0 who underwent an abdominal hysterectomy 15 days prior, who presented to the office yesterday for routine two-week postop check with complaint of purulent vaginal discharge. On exam, was noted to have a dehiscence of her vaginal cuff. She was admitted to the hospital for IV antibiotics and has been receiving IV Zosyn. She, other than the discharge was asymptomatic with normal bowel movements. No nausea, vomiting. No fever, chills. The patient was consented for repair of the vaginal cuff as well as likely need for lysis of adhesions if the bowel was adherent to the cuff. Risks, benefits, and alternatives to the surgery were reviewed at length and all questions were answered prior to going to the operating room. PROCEDURE:  The patient was taken to the operating room, positioned on the operating room table supine. After adequate anesthesia was achieved via general endotracheal anesthesia, she had her legs positioned in the [address] [place] [address].   Her perineum and distal vagina were prepped with Betadine. Her abdomen was prepped with chlorhexidine. She was draped in a sterile fashion. A time-out was performed before beginning the procedure. A sterile speculum was inserted and the vagina was inspected with a 3 cm opening in the vaginal cuff noted. A acepto-bulb was placed in the vagina to occlude it so that a pneumoperitoneum could be obtained laparoscopically. Attention was then turned to the abdominal portion of the procedure. A 5 mm incision was made in the umbilicus. Abdominal entry was attained using direct optical entry. The abdomen was then insufflated to a pressure of 15 mmHg. The patient was placed in steep Trendelenburg and the pelvis was inspected. Filmy omental adhesions were noted. Bilateral lower quadrant ports were then placed, each 5 mm, under direct visualization. Then, graspers were used to take down the filmy adhesions of the omentum, which fell cephalad easily. However, denser adhesions were noted at the vaginal cuff to the small bowel, omentum, and sigmoid colon. At this point, an intraoperative consult with General Surgery was obtained. Please see Dr. Carmen Childs dictation for description of lysis of adhesion that was performed by him. After the bowel was freed from the vaginal cuff, the vaginal cuff was repaired from below. First, the edges were identified and freshened up using sharp dissection to promote slight bleeding and re-epithelialization of tissue. The vaginal cuff was then closed using a 2-0 Stratafix suture, a monofilament suture in a running fashion achieving 1 cm bites of each edge and leaving space of approximately 1 cm between suture placement. The cuff was hemostatic at the end of the case and well approximated. Next, the operators looked above once more. The pelvis was copiously irrigated with over 1500 mL of saline with bacitracin. A Matthew drain was placed through the right lower quadrant port and sutured in place.   The other 5 mm ports were removed from the abdomen after the gas was allowed to escape. The skin of those ports were then closed using a 4-0 Monocryl and bandaged with Dermabond. All counts were correct at the end of the case. There were no complications.       Lydia Marcano DO      [id number]  D:  02/27/2020 17:22  T:  02/28/2020 3:05  JOB #:  [id number]  CC:  Julia Billy DO

## 2020-02-28 NOTE — PROGRESS NOTES
Patient complaining of nausea. Has had 2 epidodes of emesis, 1 episode after zofran administration. Called and spoke with Dr. Eugenie Sanchez from general surgery. Patient has been tearful several times today, but declines additional pain meds or other attempts for RN to assist patient.

## 2020-02-28 NOTE — PROGRESS NOTES
General Surgery Daily Progress Note    Patient: Damaris Oliver MRN: [id number]  SSN: [id number]    YOB: 1985  Age: 36 y.o. Sex: female      Admit Date: 2/26/2020    Subjective:   Pain controlled. No N/V but has had some belching. No flatus yet. Tolerating clear liquids. Current Facility-Administered Medications   Medication Dose Route Frequency    HYDROmorphone (PF) (DILAUDID) injection 1 mg  1 mg IntraVENous Q3H PRN    ketorolac (TORADOL) injection 30 mg  30 mg IntraVENous Q6H PRN    lactated Ringers infusion  75 mL/hr IntraVENous CONTINUOUS    sodium chloride (NS) flush 5-40 mL  5-40 mL IntraVENous Q8H    sodium chloride (NS) flush 5-40 mL  5-40 mL IntraVENous PRN    ondansetron (ZOFRAN) injection 4 mg  4 mg IntraVENous Q4H PRN    sodium chloride (NS) flush 5-40 mL  5-40 mL IntraVENous Q8H    sodium chloride (NS) flush 5-40 mL  5-40 mL IntraVENous PRN    diphenhydrAMINE (BENADRYL) capsule 25 mg  25 mg Oral Q6H PRN    acetaminophen (TYLENOL) tablet 650 mg  650 mg Oral Q6H PRN    0.9% sodium chloride infusion  125 mL/hr IntraVENous CONTINUOUS    piperacillin-tazobactam (ZOSYN) 3.375 g in 0.9% sodium chloride (MBP/ADV) 100 mL  3.375 g IntraVENous Q8H        Objective:   No intake/output data recorded. 02/26 1901 - 02/28 0700  In: 7427 [I.V.:3047]  Out: 1625 [Urine:775; Drains:820]  Patient Vitals for the past 8 hrs:   BP Temp Pulse Resp SpO2   02/28/20 0805 103/70 98.6 °F (37 °C) 95 18 95 %   02/28/20 0401 124/81 98.6 °F (37 °C) 65 16 97 %       Physical Exam:  General: Alert, cooperative, NAD  Lungs: Unlabored  Heart:  Regular rate and  rhythm  Abdomen: Soft, ATTP, mildly distended.  Incisions c/d/i, FRANCIS SS  Extremities: Warm, moves all, no edema  Skin:  Warm and dry, no rash    Labs:   Recent Labs     02/28/20  0712   WBC 20.3*   HGB 12.8   HCT 39.3   *     Recent Labs     02/26/20  1626      K 3.5      CO2 29   GLU 92   BUN 8   CREA 0.82   CA 9.0       Assessment / Plan:   · POD#1 diagnostic lap, closure of vaginal cuff by Dr. Mathews Proffer by Dr. Mark Jaeger  · Appropriate abdominal exam  · Clears until bowel function improves  · OOB, encourage IS  · ABX per GYN  · Repeat CBC in am

## 2020-02-28 NOTE — PROGRESS NOTES
Patient needs a note for work for [employer] since she was readmitted. Called and spoke with nurse at [address]  [facility] and was told a message would be passed along to the doctor.

## 2020-02-29 LAB
ANION GAP SERPL CALC-SCNC: 4 MMOL/L (ref 5–15)
BASOPHILS # BLD: 0 K/UL (ref 0–0.1)
BASOPHILS NFR BLD: 0 % (ref 0–1)
BUN SERPL-MCNC: 6 MG/DL (ref 6–20)
BUN/CREAT SERPL: 8 (ref 12–20)
CALCIUM SERPL-MCNC: 8 MG/DL (ref 8.5–10.1)
CHLORIDE SERPL-SCNC: 110 MMOL/L (ref 97–108)
CO2 SERPL-SCNC: 23 MMOL/L (ref 21–32)
CREAT SERPL-MCNC: 0.76 MG/DL (ref 0.55–1.02)
DIFFERENTIAL METHOD BLD: ABNORMAL
EOSINOPHIL # BLD: 0 K/UL (ref 0–0.4)
EOSINOPHIL NFR BLD: 0 % (ref 0–7)
ERYTHROCYTE [DISTWIDTH] IN BLOOD BY AUTOMATED COUNT: 13.7 % (ref 11.5–14.5)
GLUCOSE SERPL-MCNC: 126 MG/DL (ref 65–100)
HCT VFR BLD AUTO: 40 % (ref 35–47)
HGB BLD-MCNC: 13.5 G/DL (ref 11.5–16)
IMM GRANULOCYTES # BLD AUTO: 0.3 K/UL (ref 0–0.04)
IMM GRANULOCYTES NFR BLD AUTO: 1 % (ref 0–0.5)
LYMPHOCYTES # BLD: 2 K/UL (ref 0.8–3.5)
LYMPHOCYTES NFR BLD: 6 % (ref 12–49)
MAGNESIUM SERPL-MCNC: 1.8 MG/DL (ref 1.6–2.4)
MCH RBC QN AUTO: 29.5 PG (ref 26–34)
MCHC RBC AUTO-ENTMCNC: 33.8 G/DL (ref 30–36.5)
MCV RBC AUTO: 87.5 FL (ref 80–99)
MONOCYTES # BLD: 1 K/UL (ref 0–1)
MONOCYTES NFR BLD: 3 % (ref 5–13)
NEUTS SEG # BLD: 29.6 K/UL (ref 1.8–8)
NEUTS SEG NFR BLD: 90 % (ref 32–75)
NRBC # BLD: 0 K/UL (ref 0–0.01)
NRBC BLD-RTO: 0 PER 100 WBC
PLATELET # BLD AUTO: 567 K/UL (ref 150–400)
PMV BLD AUTO: 9.8 FL (ref 8.9–12.9)
POTASSIUM SERPL-SCNC: 3.9 MMOL/L (ref 3.5–5.1)
RBC # BLD AUTO: 4.57 M/UL (ref 3.8–5.2)
RBC MORPH BLD: ABNORMAL
SODIUM SERPL-SCNC: 137 MMOL/L (ref 136–145)
WBC # BLD AUTO: 32.9 K/UL (ref 3.6–11)

## 2020-02-29 PROCEDURE — 74011000258 HC RX REV CODE- 258: Performed by: STUDENT IN AN ORGANIZED HEALTH CARE EDUCATION/TRAINING PROGRAM

## 2020-02-29 PROCEDURE — 36415 COLL VENOUS BLD VENIPUNCTURE: CPT

## 2020-02-29 PROCEDURE — 74011250636 HC RX REV CODE- 250/636: Performed by: STUDENT IN AN ORGANIZED HEALTH CARE EDUCATION/TRAINING PROGRAM

## 2020-02-29 PROCEDURE — 83735 ASSAY OF MAGNESIUM: CPT

## 2020-02-29 PROCEDURE — 85025 COMPLETE CBC W/AUTO DIFF WBC: CPT

## 2020-02-29 PROCEDURE — 65270000029 HC RM PRIVATE

## 2020-02-29 PROCEDURE — 80048 BASIC METABOLIC PNL TOTAL CA: CPT

## 2020-02-29 PROCEDURE — 74011250637 HC RX REV CODE- 250/637: Performed by: OBSTETRICS & GYNECOLOGY

## 2020-02-29 PROCEDURE — 74011250636 HC RX REV CODE- 250/636: Performed by: SURGERY

## 2020-02-29 RX ORDER — ACETAMINOPHEN 500 MG
500 TABLET ORAL
Status: DISCONTINUED | OUTPATIENT
Start: 2020-02-29 | End: 2020-03-02 | Stop reason: HOSPADM

## 2020-02-29 RX ADMIN — ONDANSETRON 4 MG: 2 INJECTION INTRAMUSCULAR; INTRAVENOUS at 17:23

## 2020-02-29 RX ADMIN — IBUPROFEN 600 MG: 400 TABLET ORAL at 14:13

## 2020-02-29 RX ADMIN — Medication 10 ML: at 06:06

## 2020-02-29 RX ADMIN — PIPERACILLIN AND TAZOBACTAM 3.38 G: 3; .375 INJECTION, POWDER, LYOPHILIZED, FOR SOLUTION INTRAVENOUS at 14:13

## 2020-02-29 RX ADMIN — FAMOTIDINE 20 MG: 10 INJECTION, SOLUTION INTRAVENOUS at 10:16

## 2020-02-29 RX ADMIN — IBUPROFEN 600 MG: 400 TABLET ORAL at 21:24

## 2020-02-29 RX ADMIN — PIPERACILLIN AND TAZOBACTAM 3.38 G: 3; .375 INJECTION, POWDER, LYOPHILIZED, FOR SOLUTION INTRAVENOUS at 06:06

## 2020-02-29 RX ADMIN — FAMOTIDINE 20 MG: 10 INJECTION, SOLUTION INTRAVENOUS at 21:25

## 2020-02-29 RX ADMIN — IBUPROFEN 600 MG: 400 TABLET ORAL at 08:43

## 2020-02-29 RX ADMIN — Medication 10 ML: at 21:25

## 2020-02-29 RX ADMIN — Medication 10 ML: at 17:24

## 2020-02-29 RX ADMIN — PIPERACILLIN AND TAZOBACTAM 3.38 G: 3; .375 INJECTION, POWDER, LYOPHILIZED, FOR SOLUTION INTRAVENOUS at 21:24

## 2020-02-29 RX ADMIN — KETOROLAC TROMETHAMINE 30 MG: 30 INJECTION, SOLUTION INTRAMUSCULAR at 02:56

## 2020-02-29 NOTE — PROGRESS NOTES
Gynecology Post Operative Note    Angle Mckeon    GYN POD   Good pain control, OOB. occ nausea. +flatus and bowel movement    Vitals:  Visit Vitals  /76 (BP 1 Location: Left arm, BP Patient Position: At rest)   Pulse (!) 102   Temp 97.6 °F (36.4 °C)   Resp 16   Ht 5' 7\" (1.702 m)   Wt 79.1 kg (174 lb 6.1 oz)   LMP 2020 (Exact Date)   SpO2 95%   BMI 27.31 kg/m²     Temp (24hrs), Av °F (36.7 °C), Min:97.3 °F (36.3 °C), Max:98.5 °F (36.9 °C)      Last 24hr Input/Output:    Intake/Output Summary (Last 24 hours) at 2020 0904  Last data filed at 2020 0405  Gross per 24 hour   Intake 5157.17 ml   Output 835 ml   Net 4322.17 ml          Exam:  Patient without distress. Lungs clear              Abdomen soft, bowel sounds present, expected tenderness. Port sites c/d/i  dry and clean without erythema. Lower extremities are negative for swelling, cords, or tenderness.   Drain serosang drainage, 200cc overnight    Labs:   Lab Results   Component Value Date/Time    WBC 32.9 (H) 2020 02:41 AM    WBC 20.3 (H) 2020 07:12 AM    WBC 14.9 (H) 2020 04:26 PM    WBC 12.2 (H) 2020 04:03 AM    WBC 8.5 2020 02:27 PM    HGB 13.5 2020 02:41 AM    HGB 12.8 2020 07:12 AM    HGB 13.4 2020 04:26 PM    HGB 10.7 (L) 2020 04:03 AM    HGB 14.8 2020 02:27 PM    HCT 40.0 2020 02:41 AM    HCT 39.3 2020 07:12 AM    HCT 40.1 2020 04:26 PM    HCT 31.6 (L) 2020 04:03 AM    HCT 45.3 2020 02:27 PM    PLATELET 427 (H)  02:41 AM    PLATELET 029 (H)  07:12 AM    PLATELET 987 (H)  04:26 PM    PLATELET 674  04:03 AM    PLATELET 415  02:27 PM       Recent Results (from the past 24 hour(s))   METABOLIC PANEL, BASIC    Collection Time: 20  2:41 AM   Result Value Ref Range    Sodium 137 136 - 145 mmol/L    Potassium 3.9 3.5 - 5.1 mmol/L    Chloride 110 (H) 97 - 108 mmol/L CO2 23 21 - 32 mmol/L    Anion gap 4 (L) 5 - 15 mmol/L    Glucose 126 (H) 65 - 100 mg/dL    BUN 6 6 - 20 MG/DL    Creatinine 0.76 0.55 - 1.02 MG/DL    BUN/Creatinine ratio 8 (L) 12 - 20      GFR est AA >60 >60 ml/min/1.73m2    GFR est non-AA >60 >60 ml/min/1.73m2    Calcium 8.0 (L) 8.5 - 10.1 MG/DL   MAGNESIUM    Collection Time: 02/29/20  2:41 AM   Result Value Ref Range    Magnesium 1.8 1.6 - 2.4 mg/dL   CBC WITH AUTOMATED DIFF    Collection Time: 02/29/20  2:41 AM   Result Value Ref Range    WBC 32.9 (H) 3.6 - 11.0 K/uL    RBC 4.57 3.80 - 5.20 M/uL    HGB 13.5 11.5 - 16.0 g/dL    HCT 40.0 35.0 - 47.0 %    MCV 87.5 80.0 - 99.0 FL    MCH 29.5 26.0 - 34.0 PG    MCHC 33.8 30.0 - 36.5 g/dL    RDW 13.7 11.5 - 14.5 %    PLATELET 625 (H) 277 - 400 K/uL    MPV 9.8 8.9 - 12.9 FL    NRBC 0.0 0  WBC    ABSOLUTE NRBC 0.00 0.00 - 0.01 K/uL    NEUTROPHILS 90 (H) 32 - 75 %    LYMPHOCYTES 6 (L) 12 - 49 %    MONOCYTES 3 (L) 5 - 13 %    EOSINOPHILS 0 0 - 7 %    BASOPHILS 0 0 - 1 %    IMMATURE GRANULOCYTES 1 (H) 0.0 - 0.5 %    ABS. NEUTROPHILS 29.6 (H) 1.8 - 8.0 K/UL    ABS. LYMPHOCYTES 2.0 0.8 - 3.5 K/UL    ABS. MONOCYTES 1.0 0.0 - 1.0 K/UL    ABS. EOSINOPHILS 0.0 0.0 - 0.4 K/UL    ABS. BASOPHILS 0.0 0.0 - 0.1 K/UL    ABS. IMM. GRANS. 0.3 (H) 0.00 - 0.04 K/UL    DF SMEAR SCANNED      RBC COMMENTS NORMOCYTIC, NORMOCHROMIC       Assessment: POD 2 HD3 s/p L/S KURT, vaginal cuff repair, clinically/ hemodyamically stable    Plan:   - routine Post op care  - cont IV zosyn  - white count 32 noted, repeat in am  - bowel/ diet management per gen surg, cont clears for now.    - appreciate gen surg assitance

## 2020-02-29 NOTE — PROGRESS NOTES
Bedside shift change report given to Rubi (oncoming nurse) by Racquel oPnce (offgoing nurse). Report included the following information SBAR, Kardex, OR Summary, Intake/Output and MAR.

## 2020-02-29 NOTE — PROGRESS NOTES
Bedside shift change report given to [facility] (oncoming nurse) by Pooja (offgoing nurse). Report included the following information SBAR, Kardex, OR Summary, Intake/Output and MAR.

## 2020-02-29 NOTE — PROGRESS NOTES
Surgery POD#2 cuff closure KURT  Feels much better today, some nausea and belching, liquid BM this AM, pain much better today  Afeb, P100, [id number], FRANCIS serous, WBC32  Lungs CTA  Cor RRR  Abd soft and appropriately tender  Looks OK, likely ileus, cont clears  Add pepcid  Watch FRANCIS output  If WBC up again in AM likely CT r/o abcess

## 2020-03-01 LAB
BASOPHILS # BLD: 0.1 K/UL (ref 0–0.1)
BASOPHILS NFR BLD: 0 % (ref 0–1)
DIFFERENTIAL METHOD BLD: ABNORMAL
EOSINOPHIL # BLD: 0.3 K/UL (ref 0–0.4)
EOSINOPHIL NFR BLD: 2 % (ref 0–7)
ERYTHROCYTE [DISTWIDTH] IN BLOOD BY AUTOMATED COUNT: 13.7 % (ref 11.5–14.5)
HCT VFR BLD AUTO: 33.2 % (ref 35–47)
HGB BLD-MCNC: 10.8 G/DL (ref 11.5–16)
IMM GRANULOCYTES # BLD AUTO: 0.1 K/UL (ref 0–0.04)
IMM GRANULOCYTES NFR BLD AUTO: 1 % (ref 0–0.5)
LYMPHOCYTES # BLD: 2.1 K/UL (ref 0.8–3.5)
LYMPHOCYTES NFR BLD: 15 % (ref 12–49)
MCH RBC QN AUTO: 29.4 PG (ref 26–34)
MCHC RBC AUTO-ENTMCNC: 32.5 G/DL (ref 30–36.5)
MCV RBC AUTO: 90.5 FL (ref 80–99)
MONOCYTES # BLD: 0.8 K/UL (ref 0–1)
MONOCYTES NFR BLD: 6 % (ref 5–13)
NEUTS SEG # BLD: 10.7 K/UL (ref 1.8–8)
NEUTS SEG NFR BLD: 76 % (ref 32–75)
NRBC # BLD: 0 K/UL (ref 0–0.01)
NRBC BLD-RTO: 0 PER 100 WBC
PLATELET # BLD AUTO: 479 K/UL (ref 150–400)
PMV BLD AUTO: 9.8 FL (ref 8.9–12.9)
RBC # BLD AUTO: 3.67 M/UL (ref 3.8–5.2)
WBC # BLD AUTO: 14.2 K/UL (ref 3.6–11)

## 2020-03-01 PROCEDURE — 74011000258 HC RX REV CODE- 258: Performed by: STUDENT IN AN ORGANIZED HEALTH CARE EDUCATION/TRAINING PROGRAM

## 2020-03-01 PROCEDURE — 74011250636 HC RX REV CODE- 250/636: Performed by: SURGERY

## 2020-03-01 PROCEDURE — 74011250636 HC RX REV CODE- 250/636: Performed by: STUDENT IN AN ORGANIZED HEALTH CARE EDUCATION/TRAINING PROGRAM

## 2020-03-01 PROCEDURE — 36415 COLL VENOUS BLD VENIPUNCTURE: CPT

## 2020-03-01 PROCEDURE — 74011250637 HC RX REV CODE- 250/637: Performed by: OBSTETRICS & GYNECOLOGY

## 2020-03-01 PROCEDURE — 65270000029 HC RM PRIVATE

## 2020-03-01 PROCEDURE — 85025 COMPLETE CBC W/AUTO DIFF WBC: CPT

## 2020-03-01 RX ADMIN — Medication 10 ML: at 13:36

## 2020-03-01 RX ADMIN — IBUPROFEN 600 MG: 400 TABLET ORAL at 10:09

## 2020-03-01 RX ADMIN — PIPERACILLIN AND TAZOBACTAM 3.38 G: 3; .375 INJECTION, POWDER, LYOPHILIZED, FOR SOLUTION INTRAVENOUS at 21:55

## 2020-03-01 RX ADMIN — Medication 10 ML: at 21:55

## 2020-03-01 RX ADMIN — IBUPROFEN 600 MG: 400 TABLET ORAL at 18:15

## 2020-03-01 RX ADMIN — PIPERACILLIN AND TAZOBACTAM 3.38 G: 3; .375 INJECTION, POWDER, LYOPHILIZED, FOR SOLUTION INTRAVENOUS at 06:04

## 2020-03-01 RX ADMIN — PIPERACILLIN AND TAZOBACTAM 3.38 G: 3; .375 INJECTION, POWDER, LYOPHILIZED, FOR SOLUTION INTRAVENOUS at 13:34

## 2020-03-01 RX ADMIN — FAMOTIDINE 20 MG: 10 INJECTION, SOLUTION INTRAVENOUS at 09:26

## 2020-03-01 RX ADMIN — Medication 10 ML: at 06:05

## 2020-03-01 RX ADMIN — IBUPROFEN 600 MG: 400 TABLET ORAL at 21:55

## 2020-03-01 RX ADMIN — IBUPROFEN 600 MG: 400 TABLET ORAL at 06:25

## 2020-03-01 RX ADMIN — FAMOTIDINE 20 MG: 10 INJECTION, SOLUTION INTRAVENOUS at 20:41

## 2020-03-01 RX ADMIN — IBUPROFEN 600 MG: 400 TABLET ORAL at 13:35

## 2020-03-01 NOTE — PROGRESS NOTES
Surgery POD#3 vaginal cuff closure/KURT  Better today, no belching or N&V  Passing flatus  Afeb, P86, drain 360-tapering/clear  Lungs CTA  Cor RRR  Abd soft mildly tender  WBC14  Looks better  Advance diet  Taper IVF  Hopefully home tomm

## 2020-03-01 NOTE — PROGRESS NOTES
Problem: Falls - Risk of  Goal: *Absence of Falls  Description  Document Hosseinndjohn Emigdio Fall Risk and appropriate interventions in the flowsheet.   Outcome: Progressing Towards Goal  Note: Fall Risk Interventions:  Mobility Interventions: Patient to call before getting OOB         Medication Interventions: Patient to call before getting OOB, Teach patient to arise slowly

## 2020-03-01 NOTE — PROGRESS NOTES
Gynecology Post Operative Note    Rabia Deacon    GYN POD   +OOB, pain well controlled, tolerating PO. Continues to pass gas, more liquid stool  Feels well, scant pain    Vitals:  Visit Vitals  /88 (BP 1 Location: Right arm, BP Patient Position: At rest)   Pulse 92   Temp 97.4 °F (36.3 °C)   Resp 16   Ht 5' 7\" (1.702 m)   Wt 79.1 kg (174 lb 6.1 oz)   LMP 2020 (Exact Date)   SpO2 97%   BMI 27.31 kg/m²     Temp (24hrs), Av.8 °F (36.6 °C), Min:97.4 °F (36.3 °C), Max:98.3 °F (36.8 °C)      Last 24hr Input/Output:    Intake/Output Summary (Last 24 hours) at 3/1/2020 0941  Last data filed at 3/1/2020 0930  Gross per 24 hour   Intake 660 ml   Output 565 ml   Net 95 ml          Exam:  Patient without distress. Lungs clear              Abdomen soft, bowel sounds present, expected tenderness. Incision dry and clean without erythema. Lower extremities are negative for swelling, cords, or tenderness. FRANCIS output approx 450 in last 24hours.  More clear than yesterday    Labs:   Lab Results   Component Value Date/Time    WBC 14.2 (H) 2020 06:22 AM    WBC 32.9 (H) 2020 02:41 AM    WBC 20.3 (H) 2020 07:12 AM    WBC 14.9 (H) 2020 04:26 PM    WBC 12.2 (H) 2020 04:03 AM    WBC 8.5 2020 02:27 PM    HGB 10.8 (L) 2020 06:22 AM    HGB 13.5 2020 02:41 AM    HGB 12.8 2020 07:12 AM    HGB 13.4 2020 04:26 PM    HGB 10.7 (L) 2020 04:03 AM    HGB 14.8 2020 02:27 PM    HCT 33.2 (L) 2020 06:22 AM    HCT 40.0 2020 02:41 AM    HCT 39.3 2020 07:12 AM    HCT 40.1 2020 04:26 PM    HCT 31.6 (L) 2020 04:03 AM    HCT 45.3 2020 02:27 PM    PLATELET 043 (H)  06:22 AM    PLATELET 218 (H)  02:41 AM    PLATELET 959 (H)  07:12 AM    PLATELET 257 (H) 56/10/3400 04:26 PM    PLATELET 247  04:03 AM    PLATELET 937  02:27 PM       Recent Results (from the past 24 hour(s))   CBC WITH AUTOMATED DIFF    Collection Time: 03/01/20  6:22 AM   Result Value Ref Range    WBC 14.2 (H) 3.6 - 11.0 K/uL    RBC 3.67 (L) 3.80 - 5.20 M/uL    HGB 10.8 (L) 11.5 - 16.0 g/dL    HCT 33.2 (L) 35.0 - 47.0 %    MCV 90.5 80.0 - 99.0 FL    MCH 29.4 26.0 - 34.0 PG    MCHC 32.5 30.0 - 36.5 g/dL    RDW 13.7 11.5 - 14.5 %    PLATELET 058 (H) 884 - 400 K/uL    MPV 9.8 8.9 - 12.9 FL    NRBC 0.0 0  WBC    ABSOLUTE NRBC 0.00 0.00 - 0.01 K/uL    NEUTROPHILS 76 (H) 32 - 75 %    LYMPHOCYTES 15 12 - 49 %    MONOCYTES 6 5 - 13 %    EOSINOPHILS 2 0 - 7 %    BASOPHILS 0 0 - 1 %    IMMATURE GRANULOCYTES 1 (H) 0.0 - 0.5 %    ABS. NEUTROPHILS 10.7 (H) 1.8 - 8.0 K/UL    ABS. LYMPHOCYTES 2.1 0.8 - 3.5 K/UL    ABS. MONOCYTES 0.8 0.0 - 1.0 K/UL    ABS. EOSINOPHILS 0.3 0.0 - 0.4 K/UL    ABS. BASOPHILS 0.1 0.0 - 0.1 K/UL    ABS. IMM. GRANS. 0.1 (H) 0.00 - 0.04 K/UL    DF AUTOMATED       Assessment: POD 3 HD 4 s/p L/S KURT, cuff repair, stable. Clinically/hemodynamically stable    Plan:   1. Routine post op care  2. Cont IV zosyn  3. Consider ADAT given bowel function returned- advise gen surg agree before do so  4. FRANCIS output decreasing and clearing, remove tomorrow? 5. Home PO abx and expectations reviewed in preparation for discharge planning  6. Advised to call [facility] tomorrow to follow up on [place] paperwork  7. All ques answered. Pt understands and agrees.

## 2020-03-02 VITALS
DIASTOLIC BLOOD PRESSURE: 76 MMHG | WEIGHT: 174.38 LBS | OXYGEN SATURATION: 97 % | HEART RATE: 78 BPM | BODY MASS INDEX: 27.37 KG/M2 | RESPIRATION RATE: 17 BRPM | SYSTOLIC BLOOD PRESSURE: 117 MMHG | TEMPERATURE: 97.7 F | HEIGHT: 67 IN

## 2020-03-02 PROCEDURE — 74011000258 HC RX REV CODE- 258: Performed by: STUDENT IN AN ORGANIZED HEALTH CARE EDUCATION/TRAINING PROGRAM

## 2020-03-02 PROCEDURE — 74011250637 HC RX REV CODE- 250/637: Performed by: OBSTETRICS & GYNECOLOGY

## 2020-03-02 PROCEDURE — 74011250636 HC RX REV CODE- 250/636: Performed by: SURGERY

## 2020-03-02 PROCEDURE — 74011250636 HC RX REV CODE- 250/636: Performed by: STUDENT IN AN ORGANIZED HEALTH CARE EDUCATION/TRAINING PROGRAM

## 2020-03-02 RX ORDER — METRONIDAZOLE 500 MG/1
500 TABLET ORAL 3 TIMES DAILY
Qty: 30 TAB | Refills: 0 | Status: SHIPPED | OUTPATIENT
Start: 2020-03-02 | End: 2020-03-12

## 2020-03-02 RX ORDER — SULFAMETHOXAZOLE AND TRIMETHOPRIM 800; 160 MG/1; MG/1
1 TABLET ORAL 2 TIMES DAILY
Qty: 20 TAB | Refills: 0 | Status: SHIPPED | OUTPATIENT
Start: 2020-03-02 | End: 2020-03-12

## 2020-03-02 RX ORDER — FAMOTIDINE 20 MG/1
20 TABLET, FILM COATED ORAL 2 TIMES DAILY
Status: DISCONTINUED | OUTPATIENT
Start: 2020-03-02 | End: 2020-03-02 | Stop reason: HOSPADM

## 2020-03-02 RX ADMIN — PIPERACILLIN AND TAZOBACTAM 3.38 G: 3; .375 INJECTION, POWDER, LYOPHILIZED, FOR SOLUTION INTRAVENOUS at 06:17

## 2020-03-02 RX ADMIN — Medication 10 ML: at 06:17

## 2020-03-02 RX ADMIN — IBUPROFEN 600 MG: 400 TABLET ORAL at 08:46

## 2020-03-02 RX ADMIN — FAMOTIDINE 20 MG: 10 INJECTION, SOLUTION INTRAVENOUS at 08:46

## 2020-03-02 RX ADMIN — IBUPROFEN 600 MG: 400 TABLET ORAL at 13:55

## 2020-03-02 NOTE — PROGRESS NOTES
General Surgery Progress Note      S: Pain controlled on current regimen. Denies nausea or emesis. Passing flatus and had a BM. Drain with 300 cc output over 24 hours. Patient Vitals for the past 24 hrs:   Temp Pulse Resp BP SpO2   03/02/20 0731 97.9 °F (36.6 °C) 79 16 125/71 96 %   03/02/20 0333 97.7 °F (36.5 °C) 73 15 127/76 99 %   03/01/20 2327 97.9 °F (36.6 °C) 77 16 128/80 97 %   03/01/20 1931 98.1 °F (36.7 °C) 84 16 120/70 97 %   03/01/20 1520 97.7 °F (36.5 °C) 81 16 116/74 98 %   03/01/20 1142 98.2 °F (36.8 °C) 79 18 112/74 98 %         Date 03/01/20 0700 - 03/02/20 0659 03/02/20 0700 - 03/03/20 0659   Shift 8386-0760 5613-7493 24 Hour Total 7035-3900 0682-6707 24 Hour Total   INTAKE   P.O. 7154 998 5092        P. O. 7419 817 7385      I. V.(mL/kg/hr)  300(0.3) 300(0.2)        Volume (piperacillin-tazobactam (ZOSYN) 3.375 g in 0.9% sodium chloride (MBP/ADV) 100 mL)  300 300      Shift Total(mL/kg) 1300(16.4) 540(6.8) 1840(23.3)      OUTPUT   Urine(mL/kg/hr)           Urine Occurrence(s) 1 x 2 x 3 x      Drains 180 120 300        Output (ml) (Matthew Drain #1 02/27/20 Anterior Abdomen) 180 120 300      Stool           Stool Occurrence(s) 0 x  0 x      Shift Total(mL/kg) 180(2.3) 120(1.5) 300(3.8)      NET 3552 977 1695      Weight (kg) 79.1 79.1 79.1 79.1 79.1 79.1           Physical Exam:      General: NAD, A&Ox3   Resp: non-labored  CV: RRR  Abdomen: soft, appropriately tender, non-distended, lower midline incision without infection.  FRANCIS drain with serous output  Extremity: warm, no edema    Lab Results   Component Value Date/Time    WBC 14.2 (H) 03/01/2020 06:22 AM    HGB 10.8 (L) 03/01/2020 06:22 AM    HCT 33.2 (L) 03/01/2020 06:22 AM    PLATELET 286 (H) 66/41/1454 06:22 AM    MCV 90.5 03/01/2020 06:22 AM       Lab Results   Component Value Date/Time    Sodium 137 02/29/2020 02:41 AM    Potassium 3.9 02/29/2020 02:41 AM    Chloride 110 (H) 02/29/2020 02:41 AM    CO2 23 02/29/2020 02:41 AM    Anion gap 4 (L) 02/29/2020 02:41 AM    Glucose 126 (H) 02/29/2020 02:41 AM    BUN 6 02/29/2020 02:41 AM    Creatinine 0.76 02/29/2020 02:41 AM    BUN/Creatinine ratio 8 (L) 02/29/2020 02:41 AM    GFR est AA >60 02/29/2020 02:41 AM    GFR est non-AA >60 02/29/2020 02:41 AM    Calcium 8.0 (L) 02/29/2020 02:41 AM        No results found for: INR, PTMR, PTP, PT1, PT2, INREXT        A/P:  POD 4 s/p laparoscopic lysis of adhesions.     Doing well  Can discharge home on regular diet  Antibiotic and drain management per GYN  Follow-up with general surgery as needed       Rigoberto Sanches MD

## 2020-03-02 NOTE — PROGRESS NOTES
[facility] Dosing Services: IV to PO Conversion    The pharmacist has determined that this patient meets P & T approved criteria for conversion from IV to oral therapy for the following medication: Pepcid 20 mg IV BID    The pharmacist has written the following order for the patient: Pepcid 20 mg po BID  The pharmacist will continue to monitor the patient's status and advise the physician if conversion back to IV therapy is recommended.     Signed Rubi Russo Contact information:  [phone]

## 2020-03-02 NOTE — PROGRESS NOTES
I have reviewed discharge instructions with the patient. The patient verbalized understanding. Opportunity for question/clarification provided.

## 2020-03-02 NOTE — DISCHARGE INSTRUCTIONS
Patient Education        Opened Cut After Surgery: Care Instructions  Your Care Instructions  Sometimes a cut made during surgery opens when it isn't supposed to. This may be because of an infection or another problem that keeps the cut's edges from staying together. The doctor has checked your open cut. He or she may have put a dressing in the cut but left it open to heal. This lets the cut heal from the bottom up. Your doctor may have given you a vacuum device to take home that helps close the cut. A cut may be left open when it is infected or likely to become infected. This is because closing the cut may make an existing infection worse and a new infection more likely. You will have a bandage. Follow-up care is a key part of your treatment and safety. Be sure to make and go to all appointments, and call your doctor if you are having problems. It's also a good idea to know your test results and keep a list of the medicines you take. How can you care for yourself at home? · You may shower with soap and water. Your doctor will tell you when it is safe to use a bathtub or go swimming. · If your doctor told you how to care for your cut, follow your doctor's instructions. If you did not get instructions, follow this general advice:  ? Wash around the cut with clean water 2 times a day. Don't use hydrogen peroxide or alcohol, which can slow healing. · Avoid any activity that could cause your cut to get worse. For example, if your cut is in the belly, avoid lifting anything that would make you strain. This may include heavy grocery bags and milk containers, a heavy briefcase or backpack, cat litter or dog food bags, a vacuum , or a child. · Take pain medicines exactly as directed. ? If the doctor gave you a prescription medicine for pain, take it as prescribed. ? If you are not taking a prescription pain medicine, ask your doctor if you can take an over-the-counter medicine.   · If your doctor prescribed antibiotics, take them as directed. Do not stop taking them just because you feel better. You need to take the full course of antibiotics. · If your cut is packed (gauze is put into the cut), follow your doctor's instructions on how often and how to repack the cut. A home health worker may do this for you. When should you call for help? Call your doctor now or seek immediate medical care if:    · The cut gets bigger.     · You can see organs under the skin.     · You have new pain, or your pain gets worse.     · The cut starts to bleed, and blood soaks through the bandage. Oozing small amounts of blood is normal.     · The skin near the cut is cold or pale or changes color.     · You have tingling, weakness, or numbness near the cut.     · You have trouble moving the area near the cut.     · You have symptoms of infection, such as:  ? Increased pain, swelling, warmth, or redness around the cut.  ? Red streaks leading from the cut.  ? Pus draining from the cut.  ? A fever.    Watch closely for changes in your health, and be sure to contact your doctor if:    · The cut is not closing (getting smaller).     · You do not get better as expected. Where can you learn more? Go to http://tosha-stephy.info/. Enter G575 in the search box to learn more about \"Opened Cut After Surgery: Care Instructions. \"  Current as of: June 26, 2019  Content Version: 12.2  © 9579-1476 [employer]. Care instructions adapted under license by [employer] (which disclaims liability or warranty for this information). If you have questions about a medical condition or this instruction, always ask your healthcare professional. [place] [address] any warranty or liability for your use of this information.

## 2020-03-02 NOTE — PROGRESS NOTES
4 Days Post-Op    Doing well this morning. Pain is well managed with ibuprofen. Voiding without difficulty. Passing flatus and has had a bowel movement. Ambulating with assistance. Vitals:  Visit Vitals  /71 (BP 1 Location: Left arm, BP Patient Position: At rest)   Pulse 79   Temp 97.9 °F (36.6 °C)   Resp 16   Ht 5' 7\" (1.702 m)   Wt 79.1 kg (174 lb 6.1 oz)   LMP 2020 (Exact Date)   SpO2 96%   BMI 27.31 kg/m²     Temp (24hrs), Av.9 °F (36.6 °C), Min:97.7 °F (36.5 °C), Max:98.2 °F (36.8 °C)      Last 24hr Input/Output:    Intake/Output Summary (Last 24 hours) at 3/2/2020 0815  Last data filed at 3/2/2020 0554  Gross per 24 hour   Intake 1840 ml   Output 300 ml   Net 1540 ml          Exam:       Patient without distress. Abdomen:soft, expected tenderness, fundus firm, FRANCIS drain in place with serous fluid; 300cc out over last 24hrs     Lower extremities are nontender without edema. No cords    Labs:   Lab Results   Component Value Date/Time    WBC 14.2 (H) 2020 06:22 AM    WBC 32.9 (H) 2020 02:41 AM    WBC 20.3 (H) 2020 07:12 AM    WBC 14.9 (H) 2020 04:26 PM    WBC 12.2 (H) 2020 04:03 AM    WBC 8.5 2020 02:27 PM    HGB 10.8 (L) 2020 06:22 AM    HGB 13.5 2020 02:41 AM    HGB 12.8 2020 07:12 AM    HGB 13.4 2020 04:26 PM    HGB 10.7 (L) 2020 04:03 AM    HGB 14.8 2020 02:27 PM    HCT 33.2 (L) 2020 06:22 AM    HCT 40.0 2020 02:41 AM    HCT 39.3 2020 07:12 AM    HCT 40.1 2020 04:26 PM    HCT 31.6 (L) 2020 04:03 AM    HCT 45.3 2020 02:27 PM    PLATELET 744 (H)  06:22 AM    PLATELET 721 (H)  02:41 AM    PLATELET 562 (H)  07:12 AM    PLATELET 480 (H)  04:26 PM    PLATELET 929  04:03 AM    PLATELET 246 57/10/4561 02:27 PM       No results found for this or any previous visit (from the past 24 hour(s)).         Assessment: POD 4 HD 5 s/p L/S KURT, cuff repair, stable. Clinically/hemodynamically stable     Plan:   1. Routine post op care  2. Cont IV zosyn  3. Tolerating GI lite; consider advancing pending surgery recs  4. FRANCIS output decreasing; consider removing today pending surgery  5. Will re-eval later in morning to consider discharge today- will need 14d bactrim and flagyl po on discharge    Lu Pompa MD  [facility]        -----  Patient seen and feeling well. Tolerating a general diet. Had another bowel movement. Discussed removing drain with general surgery who recommended we leave in place until putting out <50cc/d. Will send patient home w drain in place. Follow up 1wk. Rx written for 10d abx.     Lu Pompa MD  [facility]

## 2020-03-02 NOTE — PROGRESS NOTES
3/2/2020  1:47 PM  Pt DC noted. CM reviewed EMR. Pt has no PCP, and prefers to use urgent care as needed. [facility] information provided. Pt's family to transport. No additional DC service needs indicated. Care Management Interventions  PCP Verified by CM: Yes(None. [facility] information provided. )  Mode of Transport at Discharge:  Other (see comment)(Boyfriend)  MyChart Signup: No  Discharge Durable Medical Equipment: No  Physical Therapy Consult: No  Occupational Therapy Consult: No  Speech Therapy Consult: No  Current Support Network: Lives with Spouse  Confirm Follow Up Transport: Family  Discharge Location  Discharge Placement: Home with family assistance

## 2020-03-09 NOTE — DISCHARGE SUMMARY
Gynecology Discharge Summary     Patient ID:  Bi Braxton  [id number]  89 y.o.  1985    Admit date: 2/26/2020    Discharge date and time: 3/2/2020  2:43 PM     Admission Diagnoses:    Patient Active Problem List   Diagnosis Code    Uterine fibroid D25.9    Abnormal uterine bleeding N93.9    Vaginal cuff dehiscence T81.31XA       Discharge Diagnoses: There are no discharge diagnoses documented for the most recent discharge. Patient Active Problem List   Diagnosis Code    Uterine fibroid D25.9    Abnormal uterine bleeding N93.9    Vaginal cuff dehiscence T81.31XA       Procedures for this admission: Procedure(s):  DIAGNOSTIC LAPAROSCOPY,  LYSIS OF ADHESIONS CLOSURE OF VAGINAL CUFF    Hospital Course: Patient was admitted for vaginal cuff dehiscence and pelvic infection after CIELO 2 weeks prior. She was treated with IV Zosyn and taken to the operating room for Dx laparoscopy, lysis of adhesions and closure of her vaginal cuff. She did well and was discharged home in stable condition. She was discharged home on Bactrim and Flagyl to complete a 14d course of ABX therapy. Her RLQ drain was left in place can be removed at 1wk f/u. Disposition: home    Discharged Condition : stable    Instructions: Follow-up in office  in 1 weeks.               Signed:  Ania Merino DO  3/9/2020  10:09 AM     .

## (undated) DEVICE — DRAIN SURG 19FR 0.25IN SIL RND W/ TRCR INDIC DOT RADPQ FULL

## (undated) DEVICE — SUTURE SZ 0 27IN 5/8 CIR UR-6  TAPER PT VIOLET ABSRB VICRYL J603H

## (undated) DEVICE — SOLUTION IRRIG 3000ML 0.9% SOD CHL FLX CONT 0797208] ICU MEDICAL INC]

## (undated) DEVICE — STERILE POLYISOPRENE POWDER-FREE SURGICAL GLOVES: Brand: PROTEXIS

## (undated) DEVICE — STERILE POLYISOPRENE POWDER-FREE SURGICAL GLOVES WITH EMOLLIENT COATING: Brand: PROTEXIS

## (undated) DEVICE — CANISTER, RIGID, 3000CC: Brand: MEDLINE INDUSTRIES, INC.

## (undated) DEVICE — SPONGE LAP 18X18IN STRL -- 5/PK

## (undated) DEVICE — TIP CLEANER: Brand: VALLEYLAB

## (undated) DEVICE — BARRIER TISS ADH ABSRB 3X4IN -- GYNECARE INTERCEED

## (undated) DEVICE — SUTURE VCRL SZ 2-0 L36IN ABSRB UD L36MM CT-1 1/2 CIR J945H

## (undated) DEVICE — CURVED, LARGE JAW, OPEN SEALER/DIVIDER NANO-COATED: Brand: LIGASURE IMPACT

## (undated) DEVICE — PAD,SANITARY,11 IN,MAXI,N-STRL,IND WRAP: Brand: MEDLINE

## (undated) DEVICE — SUTURE MCRYL SZ 4-0 L18IN ABSRB UD L19MM PS-2 3/8 CIR PRIM Y496G

## (undated) DEVICE — MASTISOL ADHESIVE LIQ 2/3ML

## (undated) DEVICE — ROCKER SWITCH PENCIL BLADE ELECTRODE, HOLSTER: Brand: EDGE

## (undated) DEVICE — INFECTION CONTROL KIT SYS

## (undated) DEVICE — DERMABOND SKIN ADH 0.7ML -- DERMABOND ADVANCED 12/BX

## (undated) DEVICE — SURGICAL PROCEDURE PACK GYN LAPAROSCOPY CUST SMH LF

## (undated) DEVICE — NEEDLE HYPO 22GA L1.5IN BLK S STL HUB POLYPR SHLD REG BVL

## (undated) DEVICE — (D)PREP SKN CHLRAPRP APPL 26ML -- CONVERT TO ITEM 371833

## (undated) DEVICE — SOLUTION IRRIG 1000ML H2O STRL BLT

## (undated) DEVICE — SUTURE VCRL SZ 0 L18IN ABSRB UD POLYGLACTIN 910 BRAID TIE J912G

## (undated) DEVICE — TROCAR: Brand: KII FIOS FIRST ENTRY

## (undated) DEVICE — TROCAR: Brand: KII® SLEEVE

## (undated) DEVICE — TELFA ADHESIVE ISLAND DRESSING: Brand: TELFA

## (undated) DEVICE — SURGICAL PROCEDURE PACK TISS 3X5 IN ABSORBABLE SEPRAFILM

## (undated) DEVICE — BINDER ABD S/M 12X30-45IN --

## (undated) DEVICE — REM POLYHESIVE ADULT PATIENT RETURN ELECTRODE: Brand: VALLEYLAB

## (undated) DEVICE — SOLUTION IV 1000ML 0.9% SOD CHL

## (undated) DEVICE — DBD-PACK,LAPAROTOMY,2 REINFORCED GOWNS: Brand: MEDLINE

## (undated) DEVICE — Device

## (undated) DEVICE — SURGICAL PROCEDURE PACK BASIN MAJ SET CUST NO CAUT

## (undated) DEVICE — SYR 10ML LUER LOK 1/5ML GRAD --

## (undated) DEVICE — STANDARD SURGICAL GOWN, L: Brand: CONVERTORS

## (undated) DEVICE — RESERVOIR,SUCTION,100CC,SILICONE: Brand: MEDLINE

## (undated) DEVICE — TOTAL TRAY, 16FR 10ML SIL FOLEY, URN: Brand: MEDLINE

## (undated) DEVICE — TRAY PREP DRY W/ PREM GLV 2 APPL 6 SPNG 2 UNDPD 1 OVERWRAP

## (undated) DEVICE — BLADE ELECTRODE: Brand: EDGE

## (undated) DEVICE — BLADE ASSEMB CLP HAIR FINE --

## (undated) DEVICE — DRAPE FLD WRM W44XL66IN C6L FOR INTRATEMP SYS THERMABASIN

## (undated) DEVICE — CATHETER,URETHRAL,REDRUBBER,STRL,16FR: Brand: MEDLINE

## (undated) DEVICE — INSUFFLATION NEEDLE: Brand: SURGINEEDLE

## (undated) DEVICE — GOWN,SIRUS,FABRNF,XL,20/CS: Brand: MEDLINE

## (undated) DEVICE — SUTURE VCRL SZ 3-0 L27IN ABSRB UD L26MM SH 1/2 CIR J416H

## (undated) DEVICE — LAPAROSCOPIC TROCAR SLEEVE/SINGLE USE: Brand: KII® OPTICAL ACCESS SYSTEM

## (undated) DEVICE — STRAP,POSITIONING,KNEE/BODY,FOAM,4X60": Brand: MEDLINE

## (undated) DEVICE — SUT CHRMC 3-0 27IN SH BRN --

## (undated) DEVICE — SUTURE MCRYL SZ 4-0 L27IN ABSRB UD L19MM PS-2 1/2 CIR PRIM Y426H

## (undated) DEVICE — SUTURE ABSRB L30CM 2-0 VLT SPRL PDS + STRATAFIX SXPP1B410

## (undated) DEVICE — STRIP,CLOSURE,WOUND,MEDI-STRIP,1/2X4: Brand: MEDLINE

## (undated) DEVICE — TOWEL SURG W17XL27IN STD BLU COT NONFENESTRATED PREWASHED

## (undated) DEVICE — SHEAR HARMONIC ACET 5MMX36CM -- ACE PLUS

## (undated) DEVICE — GOWN,SIRUS,NONRNF,SETINSLV,XL,20/CS: Brand: MEDLINE

## (undated) DEVICE — BAG SPEC REM 224ML W4XL6IN DIA10MM 1 HND GYN DISP ENDOPCH

## (undated) DEVICE — COVER LT HNDL PLAS RIG 1 PER PK

## (undated) DEVICE — SUTURE VCRL SZ 0 L18IN ABSRB VLT L40MM CT 1/2 CIR J752D

## (undated) DEVICE — PREP PAD BNS: Brand: CONVERTORS

## (undated) DEVICE — SUTURE VCRL SZ 0 L36IN ABSRB VLT L40MM CT 1/2 CIR J358H

## (undated) DEVICE — SUTURE PLN GUT SZ 2-0 L27IN ABSRB YELLOWISH TAN L70MM XLH 53T

## (undated) DEVICE — TISSUE RETRIEVAL SYSTEM: Brand: INZII RETRIEVAL SYSTEM

## (undated) DEVICE — INSTRMT SET WND CLSR SUT PASS --